# Patient Record
Sex: FEMALE | Race: WHITE | Employment: FULL TIME | URBAN - METROPOLITAN AREA
[De-identification: names, ages, dates, MRNs, and addresses within clinical notes are randomized per-mention and may not be internally consistent; named-entity substitution may affect disease eponyms.]

---

## 2019-02-15 ENCOUNTER — HOSPITAL ENCOUNTER (OUTPATIENT)
Age: 59
Discharge: HOME | End: 2019-02-15
Payer: COMMERCIAL

## 2019-02-18 ENCOUNTER — HOSPITAL ENCOUNTER (OUTPATIENT)
Dept: GENERAL RADIOLOGY | Age: 59
Discharge: HOME | End: 2019-02-18
Payer: COMMERCIAL

## 2019-02-18 ENCOUNTER — HOSPITAL ENCOUNTER (OUTPATIENT)
Age: 59
Discharge: HOME | End: 2019-02-18
Payer: COMMERCIAL

## 2019-02-18 DIAGNOSIS — M54.5 LOW BACK PAIN, UNSPECIFIED BACK PAIN LATERALITY, UNSPECIFIED CHRONICITY, WITH SCIATICA PRESENCE UNSPECIFIED: ICD-10-CM

## 2019-02-18 DIAGNOSIS — M54.2 CERVICALGIA: ICD-10-CM

## 2019-02-18 LAB
ALT SERPL-CCNC: 18 U/L (ref 10–40)
AST SERPL-CCNC: 15 IU/L (ref 15–37)
C-REACTIVE PROTEIN, HIGH SENSITIVITY: 11.6 MG/L
ERYTHROCYTE SEDIMENTATION RATE: 77 MM/HR (ref 0–30)
HBV SURFACE AB TITR SER: <3.5 {TITER}
HEPATITIS B SURFACE ANTIGEN: NON REACTIVE
HEPATITIS C ANTIBODY: NON REACTIVE
TOTAL CK: 122 IU/L (ref 26–140)
TSH HIGH SENSITIVITY: 9.01 UIU/ML (ref 0.27–4.2)

## 2019-02-18 PROCEDURE — 85652 RBC SED RATE AUTOMATED: CPT

## 2019-02-18 PROCEDURE — 86707 HEPATITIS BE ANTIBODY: CPT

## 2019-02-18 PROCEDURE — 82550 ASSAY OF CK (CPK): CPT

## 2019-02-18 PROCEDURE — 72050 X-RAY EXAM NECK SPINE 4/5VWS: CPT

## 2019-02-18 PROCEDURE — 84460 ALANINE AMINO (ALT) (SGPT): CPT

## 2019-02-18 PROCEDURE — 87340 HEPATITIS B SURFACE AG IA: CPT

## 2019-02-18 PROCEDURE — 71046 X-RAY EXAM CHEST 2 VIEWS: CPT

## 2019-02-18 PROCEDURE — 36415 COLL VENOUS BLD VENIPUNCTURE: CPT

## 2019-02-18 PROCEDURE — 86704 HEP B CORE ANTIBODY TOTAL: CPT

## 2019-02-18 PROCEDURE — 86038 ANTINUCLEAR ANTIBODIES: CPT

## 2019-02-18 PROCEDURE — 72100 X-RAY EXAM L-S SPINE 2/3 VWS: CPT

## 2019-02-18 PROCEDURE — 84443 ASSAY THYROID STIM HORMONE: CPT

## 2019-02-18 PROCEDURE — 84450 TRANSFERASE (AST) (SGOT): CPT

## 2019-02-18 PROCEDURE — 86803 HEPATITIS C AB TEST: CPT

## 2019-02-18 PROCEDURE — 86706 HEP B SURFACE ANTIBODY: CPT

## 2019-02-18 PROCEDURE — 86812 HLA TYPING A B OR C: CPT

## 2019-02-18 PROCEDURE — 87350 HEPATITIS BE AG IA: CPT

## 2019-02-18 PROCEDURE — 86140 C-REACTIVE PROTEIN: CPT

## 2019-02-21 LAB
ANTI-NUCLEAR ANTIBODY (ANA): NORMAL
HEPATITIS B CORE TOTAL ANTIBODY: NEGATIVE
HEPATITIS BE ANTIBODY: NEGATIVE
HEPATITIS BE ANTIGEN: NEGATIVE
HLA B27: NEGATIVE

## 2019-03-17 ENCOUNTER — HOSPITAL ENCOUNTER (INPATIENT)
Age: 59
LOS: 2 days | Discharge: HOME | DRG: 314 | End: 2019-03-19
Attending: EMERGENCY MEDICINE | Admitting: FAMILY MEDICINE
Payer: COMMERCIAL

## 2019-03-17 ENCOUNTER — APPOINTMENT (OUTPATIENT)
Dept: CT IMAGING | Age: 59
DRG: 314 | End: 2019-03-17
Payer: COMMERCIAL

## 2019-03-17 ENCOUNTER — APPOINTMENT (OUTPATIENT)
Dept: ULTRASOUND IMAGING | Age: 59
DRG: 314 | End: 2019-03-17
Payer: COMMERCIAL

## 2019-03-17 DIAGNOSIS — R10.12 LEFT UPPER QUADRANT PAIN: ICD-10-CM

## 2019-03-17 DIAGNOSIS — R55 SYNCOPE, UNSPECIFIED SYNCOPE TYPE: Primary | ICD-10-CM

## 2019-03-17 DIAGNOSIS — E03.9 HYPOTHYROIDISM, UNSPECIFIED TYPE: ICD-10-CM

## 2019-03-17 LAB
ALBUMIN SERPL-MCNC: 4.2 GM/DL (ref 3.4–5)
ALP BLD-CCNC: 117 IU/L (ref 40–129)
ALT SERPL-CCNC: 94 U/L (ref 10–40)
ANION GAP SERPL CALCULATED.3IONS-SCNC: 13 MMOL/L (ref 4–16)
AST SERPL-CCNC: 71 IU/L (ref 15–37)
BASOPHILS ABSOLUTE: 0 K/CU MM
BASOPHILS RELATIVE PERCENT: 0.5 % (ref 0–1)
BILIRUB SERPL-MCNC: 1 MG/DL (ref 0–1)
BUN BLDV-MCNC: 22 MG/DL (ref 6–23)
CALCIUM SERPL-MCNC: 9 MG/DL (ref 8.3–10.6)
CHLORIDE BLD-SCNC: 102 MMOL/L (ref 99–110)
CO2: 23 MMOL/L (ref 21–32)
CREAT SERPL-MCNC: 1 MG/DL (ref 0.6–1.1)
DIFFERENTIAL TYPE: ABNORMAL
EKG ATRIAL RATE: 72 BPM
EKG DIAGNOSIS: NORMAL
EKG P AXIS: 54 DEGREES
EKG P-R INTERVAL: 142 MS
EKG Q-T INTERVAL: 396 MS
EKG QRS DURATION: 82 MS
EKG QTC CALCULATION (BAZETT): 433 MS
EKG R AXIS: 41 DEGREES
EKG T AXIS: 48 DEGREES
EKG VENTRICULAR RATE: 72 BPM
EOSINOPHILS ABSOLUTE: 0.1 K/CU MM
EOSINOPHILS RELATIVE PERCENT: 1.2 % (ref 0–3)
GFR AFRICAN AMERICAN: >60 ML/MIN/1.73M2
GFR NON-AFRICAN AMERICAN: 57 ML/MIN/1.73M2
GLUCOSE BLD-MCNC: 106 MG/DL (ref 70–99)
HCT VFR BLD CALC: 39.5 % (ref 37–47)
HEMOGLOBIN: 12.8 GM/DL (ref 12.5–16)
IMMATURE NEUTROPHIL %: 2.1 % (ref 0–0.43)
LYMPHOCYTES ABSOLUTE: 1.2 K/CU MM
LYMPHOCYTES RELATIVE PERCENT: 21.2 % (ref 24–44)
MCH RBC QN AUTO: 31.2 PG (ref 27–31)
MCHC RBC AUTO-ENTMCNC: 32.4 % (ref 32–36)
MCV RBC AUTO: 96.3 FL (ref 78–100)
MONOCYTES ABSOLUTE: 0.6 K/CU MM
MONOCYTES RELATIVE PERCENT: 9.7 % (ref 0–4)
NUCLEATED RBC %: 0 %
PDW BLD-RTO: 12.9 % (ref 11.7–14.9)
PLATELET # BLD: 249 K/CU MM (ref 140–440)
PMV BLD AUTO: 9.2 FL (ref 7.5–11.1)
POTASSIUM SERPL-SCNC: 4 MMOL/L (ref 3.5–5.1)
RBC # BLD: 4.1 M/CU MM (ref 4.2–5.4)
SEGMENTED NEUTROPHILS ABSOLUTE COUNT: 3.8 K/CU MM
SEGMENTED NEUTROPHILS RELATIVE PERCENT: 65.3 % (ref 36–66)
SODIUM BLD-SCNC: 138 MMOL/L (ref 135–145)
TOTAL IMMATURE NEUTOROPHIL: 0.12 K/CU MM
TOTAL NUCLEATED RBC: 0 K/CU MM
TOTAL PROTEIN: 7.4 GM/DL (ref 6.4–8.2)
TROPONIN T: <0.01 NG/ML
TSH HIGH SENSITIVITY: 13.84 UIU/ML (ref 0.27–4.2)
WBC # BLD: 5.8 K/CU MM (ref 4–10.5)

## 2019-03-17 PROCEDURE — 6370000000 HC RX 637 (ALT 250 FOR IP): Performed by: NURSE PRACTITIONER

## 2019-03-17 PROCEDURE — 93010 ELECTROCARDIOGRAM REPORT: CPT | Performed by: INTERNAL MEDICINE

## 2019-03-17 PROCEDURE — 2580000003 HC RX 258: Performed by: EMERGENCY MEDICINE

## 2019-03-17 PROCEDURE — 85025 COMPLETE CBC W/AUTO DIFF WBC: CPT

## 2019-03-17 PROCEDURE — 96375 TX/PRO/DX INJ NEW DRUG ADDON: CPT

## 2019-03-17 PROCEDURE — 96374 THER/PROPH/DIAG INJ IV PUSH: CPT

## 2019-03-17 PROCEDURE — 96376 TX/PRO/DX INJ SAME DRUG ADON: CPT

## 2019-03-17 PROCEDURE — 6360000004 HC RX CONTRAST MEDICATION: Performed by: EMERGENCY MEDICINE

## 2019-03-17 PROCEDURE — 93880 EXTRACRANIAL BILAT STUDY: CPT

## 2019-03-17 PROCEDURE — 1200000000 HC SEMI PRIVATE

## 2019-03-17 PROCEDURE — 96372 THER/PROPH/DIAG INJ SC/IM: CPT

## 2019-03-17 PROCEDURE — 2500000003 HC RX 250 WO HCPCS: Performed by: NURSE PRACTITIONER

## 2019-03-17 PROCEDURE — 6360000002 HC RX W HCPCS: Performed by: NURSE PRACTITIONER

## 2019-03-17 PROCEDURE — 93005 ELECTROCARDIOGRAM TRACING: CPT | Performed by: EMERGENCY MEDICINE

## 2019-03-17 PROCEDURE — 84443 ASSAY THYROID STIM HORMONE: CPT

## 2019-03-17 PROCEDURE — 80053 COMPREHEN METABOLIC PANEL: CPT

## 2019-03-17 PROCEDURE — 74177 CT ABD & PELVIS W/CONTRAST: CPT

## 2019-03-17 PROCEDURE — 99285 EMERGENCY DEPT VISIT HI MDM: CPT

## 2019-03-17 PROCEDURE — 84484 ASSAY OF TROPONIN QUANT: CPT

## 2019-03-17 PROCEDURE — 6360000002 HC RX W HCPCS: Performed by: EMERGENCY MEDICINE

## 2019-03-17 PROCEDURE — 36415 COLL VENOUS BLD VENIPUNCTURE: CPT

## 2019-03-17 PROCEDURE — 71275 CT ANGIOGRAPHY CHEST: CPT

## 2019-03-17 PROCEDURE — G0378 HOSPITAL OBSERVATION PER HR: HCPCS

## 2019-03-17 PROCEDURE — 2580000003 HC RX 258: Performed by: NURSE PRACTITIONER

## 2019-03-17 RX ORDER — SODIUM CHLORIDE 0.9 % (FLUSH) 0.9 %
10 SYRINGE (ML) INJECTION PRN
Status: DISCONTINUED | OUTPATIENT
Start: 2019-03-17 | End: 2019-03-19 | Stop reason: HOSPADM

## 2019-03-17 RX ORDER — SODIUM CHLORIDE 0.9 % (FLUSH) 0.9 %
10 SYRINGE (ML) INJECTION EVERY 12 HOURS SCHEDULED
Status: DISCONTINUED | OUTPATIENT
Start: 2019-03-17 | End: 2019-03-19 | Stop reason: HOSPADM

## 2019-03-17 RX ORDER — 0.9 % SODIUM CHLORIDE 0.9 %
1000 INTRAVENOUS SOLUTION INTRAVENOUS ONCE
Status: COMPLETED | OUTPATIENT
Start: 2019-03-17 | End: 2019-03-17

## 2019-03-17 RX ORDER — HYDROMORPHONE HYDROCHLORIDE 2 MG/1
1 TABLET ORAL EVERY 4 HOURS PRN
Status: DISCONTINUED | OUTPATIENT
Start: 2019-03-17 | End: 2019-03-18

## 2019-03-17 RX ORDER — FENTANYL CITRATE 50 UG/ML
25 INJECTION, SOLUTION INTRAMUSCULAR; INTRAVENOUS ONCE
Status: COMPLETED | OUTPATIENT
Start: 2019-03-17 | End: 2019-03-17

## 2019-03-17 RX ORDER — IBUPROFEN 600 MG/1
600 TABLET ORAL
Status: DISCONTINUED | OUTPATIENT
Start: 2019-03-17 | End: 2019-03-19 | Stop reason: HOSPADM

## 2019-03-17 RX ORDER — ACETAMINOPHEN 325 MG/1
650 TABLET ORAL EVERY 4 HOURS PRN
Status: DISCONTINUED | OUTPATIENT
Start: 2019-03-17 | End: 2019-03-19 | Stop reason: HOSPADM

## 2019-03-17 RX ORDER — LEVOTHYROXINE SODIUM 0.1 MG/1
100 TABLET ORAL DAILY
Status: DISCONTINUED | OUTPATIENT
Start: 2019-03-17 | End: 2019-03-18

## 2019-03-17 RX ORDER — 0.9 % SODIUM CHLORIDE 0.9 %
10 VIAL (ML) INJECTION
Status: COMPLETED | OUTPATIENT
Start: 2019-03-17 | End: 2019-03-17

## 2019-03-17 RX ORDER — SODIUM CHLORIDE 9 MG/ML
INJECTION, SOLUTION INTRAVENOUS CONTINUOUS
Status: DISPENSED | OUTPATIENT
Start: 2019-03-17 | End: 2019-03-18

## 2019-03-17 RX ORDER — VALSARTAN 320 MG/1
320 TABLET ORAL DAILY
Status: ON HOLD | COMMUNITY
End: 2019-03-19 | Stop reason: HOSPADM

## 2019-03-17 RX ORDER — ONDANSETRON 2 MG/ML
4 INJECTION INTRAMUSCULAR; INTRAVENOUS ONCE
Status: COMPLETED | OUTPATIENT
Start: 2019-03-17 | End: 2019-03-17

## 2019-03-17 RX ORDER — PREDNISONE 10 MG/1
10 TABLET ORAL DAILY
Status: DISCONTINUED | OUTPATIENT
Start: 2019-03-17 | End: 2019-03-19 | Stop reason: HOSPADM

## 2019-03-17 RX ORDER — GABAPENTIN 300 MG/1
300 CAPSULE ORAL 2 TIMES DAILY
COMMUNITY
End: 2019-05-08

## 2019-03-17 RX ORDER — VALSARTAN 160 MG/1
320 TABLET ORAL DAILY
Status: DISCONTINUED | OUTPATIENT
Start: 2019-03-17 | End: 2019-03-19

## 2019-03-17 RX ORDER — ETODOLAC 400 MG/1
500 TABLET, FILM COATED ORAL 2 TIMES DAILY
Status: DISCONTINUED | OUTPATIENT
Start: 2019-03-17 | End: 2019-03-17 | Stop reason: CLARIF

## 2019-03-17 RX ORDER — GABAPENTIN 300 MG/1
300 CAPSULE ORAL 2 TIMES DAILY
Status: DISCONTINUED | OUTPATIENT
Start: 2019-03-17 | End: 2019-03-19 | Stop reason: HOSPADM

## 2019-03-17 RX ORDER — ONDANSETRON 2 MG/ML
4 INJECTION INTRAMUSCULAR; INTRAVENOUS EVERY 6 HOURS PRN
Status: DISCONTINUED | OUTPATIENT
Start: 2019-03-17 | End: 2019-03-19 | Stop reason: HOSPADM

## 2019-03-17 RX ADMIN — HYDROMORPHONE HYDROCHLORIDE 1 MG: 2 TABLET ORAL at 22:24

## 2019-03-17 RX ADMIN — LEVOTHYROXINE SODIUM 100 MCG: 100 TABLET ORAL at 17:47

## 2019-03-17 RX ADMIN — FAMOTIDINE 20 MG: 10 INJECTION, SOLUTION INTRAVENOUS at 20:46

## 2019-03-17 RX ADMIN — IOPAMIDOL 75 ML: 755 INJECTION, SOLUTION INTRAVENOUS at 13:12

## 2019-03-17 RX ADMIN — SODIUM CHLORIDE 1000 ML: 9 INJECTION, SOLUTION INTRAVENOUS at 12:56

## 2019-03-17 RX ADMIN — SODIUM CHLORIDE 10 ML: 9 INJECTION, SOLUTION INTRAMUSCULAR; INTRAVENOUS; SUBCUTANEOUS at 13:13

## 2019-03-17 RX ADMIN — GABAPENTIN 300 MG: 300 CAPSULE ORAL at 20:46

## 2019-03-17 RX ADMIN — SODIUM CHLORIDE: 9 INJECTION, SOLUTION INTRAVENOUS at 20:46

## 2019-03-17 RX ADMIN — FENTANYL CITRATE 25 MCG: 50 INJECTION INTRAMUSCULAR; INTRAVENOUS at 14:47

## 2019-03-17 RX ADMIN — ENOXAPARIN SODIUM 40 MG: 40 INJECTION SUBCUTANEOUS at 17:50

## 2019-03-17 RX ADMIN — VALSARTAN 320 MG: 160 TABLET, FILM COATED ORAL at 17:47

## 2019-03-17 RX ADMIN — FENTANYL CITRATE 25 MCG: 50 INJECTION INTRAMUSCULAR; INTRAVENOUS at 12:53

## 2019-03-17 RX ADMIN — PREDNISONE 10 MG: 10 TABLET ORAL at 17:47

## 2019-03-17 RX ADMIN — ONDANSETRON 4 MG: 2 INJECTION INTRAMUSCULAR; INTRAVENOUS at 12:56

## 2019-03-17 RX ADMIN — ONDANSETRON 4 MG: 2 INJECTION INTRAMUSCULAR; INTRAVENOUS at 20:46

## 2019-03-17 ASSESSMENT — PAIN SCALES - GENERAL
PAINLEVEL_OUTOF10: 7
PAINLEVEL_OUTOF10: 6
PAINLEVEL_OUTOF10: 7
PAINLEVEL_OUTOF10: 5
PAINLEVEL_OUTOF10: 7
PAINLEVEL_OUTOF10: 6
PAINLEVEL_OUTOF10: 7

## 2019-03-17 ASSESSMENT — PAIN DESCRIPTION - ORIENTATION: ORIENTATION: LEFT;UPPER

## 2019-03-17 ASSESSMENT — PAIN DESCRIPTION - LOCATION: LOCATION: ABDOMEN

## 2019-03-17 NOTE — ED NOTES
Report called to [facility] RN Patient ready for transport. Collette Chan [facility]  03/17/19 [id number]

## 2019-03-17 NOTE — ED PROVIDER NOTES
eMERGENCY dEPARTMENT eNCOUnter      CHIEF COMPLAINT:   Syncope  Abdominal pain    HPI: Suzanne Herman is a 66 y.o. female who presents to the ED for evaluation after having a syncopal episode. The patient states that she passed out twice today at home. She was in the shower initially and felt lightheaded and passed out. She denies being injured with this episode. She then passed out again and so family called EMS. She states she passed out 2 other times this week. She denies any injury with these episodes. She denies any preceding chest pain or palpitations. She also complains of left upper quadrant abdominal pain for the past week. She states that it is achy and sharp. It is constant. There are no exacerbating or alleviating factors. She denies any associated nausea, vomiting or diarrhea. She rates the pain as 8 out of 10. She denies fevers, chills, chest pain, shortness of breath, flank pain, dysuria, hematuria or any other complaints. REVIEW OF SYSTEMS:   Constitutional:  Denies fever or chills  Eyes:  Denies change in visual acuity  HENT:  Denies nasal congestion or sore throat  Respiratory:  Denies cough or shortness of breath  Cardiovascular:  Denies chest pain or edema  GI:  + abdominal pain, denies nausea, vomiting, bloody stools or diarrhea  :  Denies dysuria  Musculoskeletal:  Denies back pain or joint pain  Integument:  Denies rash  Neurologic:  Admits to having a syncopal episode, Denies headache, focal weakness or sensory changes  \"Remaining review of systems reviewed and negative. I have reviewed the nursing triage documentation and agree unless otherwise noted below. \"      PAST MEDICAL HISTORY:   Past Medical History:   Diagnosis Date    Anxiety     \"work related\"    Arthritis     Chest pain     Fatigue     Fatigue     \"increased fatigue, random fever, headaches- doing lumbar puncture to check this\"(scheduled 9/11/2013)    Generalized anxiety disorder     GERD (gastroesophageal reflux disease)     Occasionally    H/O cardiovascular stress test 1/13/2014    cardiolite-no ischemia, EF 70%,normal    H/O CT scan of chest 12/23/13    Right upper lobe pulmonary nodule continues to shrink in size. Mediastinal and hilar adenopathy  are present but also significant improved.  Histoplasmosis     History of cardiac monitoring 1/13/14    Event - NSR    History of echocardiogram 1/13/2014    EF 55% mild TR    History of histoplasmosis     History of hysterectomy     History of kidney stones     \"8 yrs ago- passed them on my own\"    History of lymph node biopsy     Hypertension     Hypothyroidism     Kidney stones     Migraines     \"have had a constant migraine for the past 6 weeks, gets worse at times but never goes away\"    Mild tricuspid regurgitation 1/13/2014    Peptic ulcer     Peptic ulcer     As a teenager.  Shortness of breath     SOB (shortness of breath)     Syncope and collapse     Thyroid disease        CURRENT MEDICATIONS:   Home medications reviewed. SURGICAL HISTORY:   Past Surgical History:   Procedure Laterality Date    APPENDECTOMY      APPENDECTOMY  1979(pc)    CHOLECYSTECTOMY      CHOLECYSTECTOMY  2008(pc)    Lap Choley    COLONOSCOPY  2008    DILATION AND CURETTAGE OF UTERUS  2008    ENDOSCOPY, COLON, DIAGNOSTIC  2008    HYSTERECTOMY  2008    per old chart pt had exp.  laparotomy with lysis of adhesions and JACK/BSO done 2008(pc)    KNEE SURGERY Left 11/2012    \"ACL surgery with a scope\"    MEDIASTINOSCOPY  11/05/2013    OTHER SURGICAL HISTORY  11 05 2013    medianstinoscopy    JACK AND BSO         FAMILY HISTORY:   Family History   Problem Relation Age of Onset    Other Mother         lung neoplasm, malignant    Cancer Mother         lung ca    Heart Disease Mother     Other Father         lymphoma    Cancer Father         lymphoma    High Blood Pressure Sister     Heart Disease Sister     Stroke Sister         Andrea Hedger Diabetes Sister     Heart Disease Maternal Grandfather        SOCIAL HISTORY:   Social History     Socioeconomic History    Marital status:      Spouse name: Not on file    Number of children: Not on file    Years of education: Not on file    Highest education level: Not on file   Occupational History    Not on file   Social Needs    Financial resource strain: Not on file    Food insecurity:     Worry: Not on file     Inability: Not on file    Transportation needs:     Medical: Not on file     Non-medical: Not on file   Tobacco Use    Smoking status: Never Smoker    Smokeless tobacco: Never Used    Tobacco comment: exposed to heavy 2nd hand smoke x 18 yrs per parents   Substance and Sexual Activity    Alcohol use: Yes     Comment: glass of wine one time per month at the most     CAFFEINE: 1 cup coffee or 1 soda daily.  Drug use: No    Sexual activity: Not on file     Comment:    Lifestyle    Physical activity:     Days per week: Not on file     Minutes per session: Not on file    Stress: Not on file   Relationships    Social connections:     Talks on phone: Not on file     Gets together: Not on file     Attends Judaism service: Not on file     Active member of club or organization: Not on file     Attends meetings of clubs or organizations: Not on file     Relationship status: Not on file    Intimate partner violence:     Fear of current or ex partner: Not on file     Emotionally abused: Not on file     Physically abused: Not on file     Forced sexual activity: Not on file   Other Topics Concern    Not on file   Social History Narrative    ** Merged History Encounter **            ALLERGIES: Other; Pcn [penicillins]; and Pcn [penicillins]    PHYSICAL EXAM:  VITAL SIGNS:   ED Triage Vitals   Enc Vitals Group      BP       Pulse       Resp       Temp       Temp src       SpO2       Weight       Height       Head Circumference       Peak Flow       Pain Score       Pain Loc       Pain Edu? Excl. pm    TECHNIQUE:  CTA of the chest, abdomen and pelvis was performed after the administration  of intravenous contrast.  Multiplanar reformatted images are provided for  review.  Dose modulation, iterative reconstruction, and/or weight based  adjustment of the mA/kV was utilized to reduce the radiation dose to as low  as reasonably achievable. COMPARISON:  CT chest 02/20/2014    HISTORY:  ORDERING SYSTEM PROVIDED HISTORY: Syncope, Evaluate for PE, left rib pain  after fall, evaluate for fracture  TECHNOLOGIST PROVIDED HISTORY:  Ordering Physician Provided Reason for Exam: Syncope, Evaluate for PE, left  rib pain after fall, evalaute for fracture  Acuity: Acute  Type of Exam: Initial  Additional signs and symptoms: none  Relevant Medical/Surgical History: 100ml isovue 370/ gfr>60; ORDERING SYSTEM  PROVIDED HISTORY: LUQ abdominal pain  TECHNOLOGIST PROVIDED HISTORY:  Additional Contrast?->None  Ordering Physician Provided Reason for Exam: LUQ abdominal pain  Acuity: Acute  Type of Exam: Initial  Additional signs and symptoms: none  Relevant Medical/Surgical History: contrast used from cta chest    FINDINGS:    Chest:    Pulmonary Arteries: Pulmonary arteries are adequately opacified for  evaluation. No evidence of intraluminal filling defect to suggest pulmonary  embolism.  Main pulmonary artery is normal in caliber. Mediastinum: No evidence of mediastinal lymphadenopathy.  The heart and  pericardium demonstrate no acute abnormality.  There is no acute abnormality  of the thoracic aorta. Lungs/pleura: Central airways are clear.  There are multiple, ground-glass  nodular opacities throughout the lungs.  No evidence of pulmonary edema.  No  pleural effusion or pneumothorax. Soft Tissues/Bones: No acute bone or soft tissue abnormality. Abdomen/Pelvis:    Organs:   Liver is normal in morphology.  No biliary duct dilatation.  Post  cholecystectomy.  Spleen, adrenal glands, and pancreas are normal. HISTORY:  Ordering Physician Provided Reason for Exam: Syncope, Evaluate for PE, left  rib pain after fall, evalaute for fracture  Acuity: Acute  Type of Exam: Initial  Additional signs and symptoms: none  Relevant Medical/Surgical History: 100ml isovue 370/ gfr>60; ORDERING SYSTEM  PROVIDED HISTORY: LUQ abdominal pain  TECHNOLOGIST PROVIDED HISTORY:  Additional Contrast?->None  Ordering Physician Provided Reason for Exam: LUQ abdominal pain  Acuity: Acute  Type of Exam: Initial  Additional signs and symptoms: none  Relevant Medical/Surgical History: contrast used from cta chest    FINDINGS:    Chest:    Pulmonary Arteries: Pulmonary arteries are adequately opacified for  evaluation. No evidence of intraluminal filling defect to suggest pulmonary  embolism.  Main pulmonary artery is normal in caliber. Mediastinum: No evidence of mediastinal lymphadenopathy.  The heart and  pericardium demonstrate no acute abnormality.  There is no acute abnormality  of the thoracic aorta. Lungs/pleura: Central airways are clear.  There are multiple, ground-glass  nodular opacities throughout the lungs.  No evidence of pulmonary edema.  No  pleural effusion or pneumothorax. Soft Tissues/Bones: No acute bone or soft tissue abnormality. Abdomen/Pelvis:    Organs:   Liver is normal in morphology. No biliary duct dilatation.  Post  cholecystectomy.  Spleen, adrenal glands, and pancreas are normal.  Kidneys  are symmetric in size and enhancement.   No hydronephrosis. GI/Bowel: Stomach, small bowel, and colon are nondilated. Post  appendectomy. Submucosal fat deposition involving the ascending and proximal  transverse colon is consistent with prior inflammatory process. Pelvis: Urinary bladder is unremarkable.  Uterus is surgically absent.  No  free fluid in the pelvis. Peritoneum/Retroperitoneum:   Abdominal aorta is normal in caliber.  No  abdominal or retroperitoneal adenopathy.  No free fluid in the tonic-clonic movements and no loss of bowel or bladder function, which makes seizure seem unlikely. I have a low suspicion for seizure, concussion, arrhythmia, psychosis, overdose, cardiac or respiratory arrest, status epilepticus, meningitis, or sepsis. The etiology of the patient's abdominal pain is unclear at this time. I have a low suspicion for acute appendicitis, intra-abdominal abscess, bowel obstruction, perforation or acute surgical abdomen. I recommended admission to the hospital and the patient was agreeable. I discussed the case with the hospitalist who will admit the patient for further treatment and care. The patient is currently in stable condition awaiting admission. Clinical Impression:  1. Syncope, unspecified syncope type    2. Left upper quadrant pain    3. Hypothyroidism, unspecified type        Comment: Please note this report has been produced using speech recognition software and may contain errors related to that system including errors in grammar, punctuation, and spelling, as well as words and phrases that may be inappropriate. If there are any questions or concerns please feel free to contact the dictating provider for clarification.         Frankie Mancuso MD  03/17/19 [id number]

## 2019-03-17 NOTE — ED NOTES
Bed: 03TR-03  Expected date:   Expected time:   Means of arrival:   Comments:  Za Lomas., RN  03/17/19 0729

## 2019-03-17 NOTE — ED NOTES
Transport at bedside. Patient taken to the floor. Collette Rivera [facility], [facility]  03/17/19 [id number]

## 2019-03-17 NOTE — ED NOTES
Med rec completed with patient and family member over the phone. Collette Enrique, [facility]  03/17/19 [id number]

## 2019-03-17 NOTE — ED TRIAGE NOTES
Patient brought to the ED via EMS from home after \"passing out\" in the shower this morning. No reported injury. LUQ worsening over 1 week. Productive cough for over a week. started on Methotrexate 3 weeks ago for RA.

## 2019-03-17 NOTE — H&P
History and Physical      Name:  Segundo Prescott /Age/Sex: 1960  (66 y.o. female)   MRN & CSN:  [id number] & [id number] Admission Date/Time: 3/17/2019 11:10 AM   Location:  [id number]03 PCP: Janneth Darby MD       Hospital Day: 1    Discussed patient and POC with Dr. Ian Bess and Plan:     Segundo Prescott is a 66 y.o.  female  who presents with multiple syncopal episodes and left-sided abdominal pain. - Syncope with collapse  Denies injury with episodes over the last 4 weeks  Orthostatics  Shalom sob, palpitations  Troponin neg  Echo  Carotid duplex  Trend trop    - Left-sided abdominal pain  Radiation to back, worse with PO intake but not improved by not eating  CT abdomen/pelvis without acute findings  Denies melanotic stool, hematochezia  Clear liquid diet  IVF due to decreased PO intake last 4 days  Pain control  C/s to GI  Patient is on steroids and NSaids for rheumatoid arthritis    - Hypothyroidism  TSH 13.8  Patient reports she is currently working with PCP on adjusting Synthroid   Continue home synthroid 100 mcg  Obtain T 4    -- Chronic: treatment continued per home medications unless  contraindicated by above plan and assessment. Rheumatoid arthritis   HTN home meds      Discussed patient with ED physician    Diet Clear liquid   DVT Prophylaxis [x] Lovenox, []  Heparin, [] SCDs, [] Ambulation   GI Prophylaxis [] PPI,  [x] H2 Blocker,  [] Carafate,  [] Diet/Tube Feeds   Code Status Full   Disposition Patient requires continued admission due to    MDM [] Low, [x] Moderate,[]  High  Patient's risk as above due to      [] One or more chronic illnesses with mild exacerbation progression      [x] Two or more stable chronic illnesses      [x] Undiagnosed new problem with uncertain prognosis      [] Elective major surgery      []Prescription drug management     History of Present Illness:     Chief Complaint: Syncopal episodes with diffuse abdominal pain    Segundo Prescott is a 66 y.o. female  who presents from home with 4 syncopal episodes over the last week, 2 today. She denies injury associated with these. She is not clear if she is experiencing light-headedness and other pre-syncopal symptoms with these events. She denies chest pain and palpitations. She also reports new-onset left-sided abdominal pain over the same period. Denies melanotic stool, hematochezia, emesis. States she is nauseas. States eating food makes pain worse; not eating does not help pain. She reports upper and lower scopes about 10 years ago. States abdominal pain is left side from under rib cage to lower abdominal with radiation to her back and sometimes across the lower abdomen to the right side. Denies abnormal vaginal discharge. Has had hysterectomy and cholecystectomy remotely. She denies fever/chills, cough, chest pressure/pain. At time of exam rates pain 8/10 for left-sided abdomen. Principal Problem:   Syncope with collapse, abdominal pain    Present on admission:  History of histoplasmosis 2014      Hypothyroidism      Rheumatoid arthritis        Ten point ROS reviewed negative, unless as noted above    Objective:   No intake or output data in the 24 hours ending 03/17/19 1548   Vitals:   Vitals:    03/17/19 1419   BP: 110/61   Pulse: 66   Resp: 14   Temp: 98.3   SpO2: 96%     Physical Exam:   GEN Awake female, sitting upright in bed in no apparent distress. Appears given age. EYES Pupils are 3mm and PERRLA bilat. EOMs intact. No scleral erythema, discharge, or conjunctivitis. HENT Mucous membranes are moist. Oral pharynx without exudates, no evidence of thrush. NECK Supple, no apparent thyromegaly or masses. RESP Clear to auscultation, no wheezes, rales or rhonchi. Symmetric chest movement while on room air. CARDIO/VASC S1/S2 auscultated. Regular rate without appreciable murmurs, rubs, or gallops. No JVD or carotid bruits. Peripheral pulses equal bilaterally and palpable.  No peripheral edema.  GI Abdomen is diffusely tender across RLQ and LLQ and LUQ. Abdomen is soft without significant guarding. No mass noted. Bowel sounds are normoactive. Rectal exam deferred.  No costovertebral angle tenderness. Valadez catheter is not present. HEME/LYMPH No palpable cervical lymphadenopathy and no hepatosplenomegaly. No petechiae or ecchymoses. MSK No gross joint deformities. SKIN Normal coloration, warm, dry. NEURO Cranial nerves appear grossly intact, normal speech, no lateralizing weakness. PSYCH Awake, alert, oriented x 4. Affect appropriate. Past Medical History:      Past Medical History:   Diagnosis Date    Anxiety     \"work related\"    Arthritis     Chest pain     Fatigue     Fatigue     \"increased fatigue, random fever, headaches- doing lumbar puncture to check this\"(scheduled 9/11/2013)    Generalized anxiety disorder     GERD (gastroesophageal reflux disease)     Occasionally    H/O cardiovascular stress test 1/13/2014    cardiolite-no ischemia, EF 70%,normal    H/O CT scan of chest 12/23/13    Right upper lobe pulmonary nodule continues to shrink in size. Mediastinal and hilar adenopathy  are present but also significant improved.  Histoplasmosis     History of cardiac monitoring 1/13/14    Event - NSR    History of echocardiogram 1/13/2014    EF 55% mild TR    History of histoplasmosis     History of hysterectomy     History of kidney stones     \"8 yrs ago- passed them on my own\"    History of lymph node biopsy     Hypertension     Hypothyroidism     Kidney stones     Migraines     \"have had a constant migraine for the past 6 weeks, gets worse at times but never goes away\"    Mild tricuspid regurgitation 1/13/2014    Peptic ulcer     Peptic ulcer     As a teenager.  Shortness of breath     SOB (shortness of breath)     Syncope and collapse     Thyroid disease      PSHX:  has a past surgical history that includes Appendectomy;  Cholecystectomy; russell and bso (cervix removed); Appendectomy (1979(pc)); Cholecystectomy (2008(pc)); Hysterectomy (2008); Endoscopy, colon, diagnostic (2008); Colonoscopy (2008); Dilation and curettage of uterus (2008); knee surgery (Left, 11/2012); other surgical history (11 05 2013); and Mediastinoscopy (11/05/2013). Allergies: Allergies   Allergen Reactions    Other Anaphylaxis     allergic to cilantro \"trouble breathing\"    Pcn [Penicillins] Nausea Only    Pcn [Penicillins] Nausea And Vomiting       FAM HX: family history includes Cancer in her father and mother; Diabetes in her sister; Heart Disease in her maternal grandfather, mother, and sister; High Blood Pressure in her sister; Other in her father and mother; Stroke in her sister. Soc HX:   Social History     Socioeconomic History    Marital status:      Spouse name: None    Number of children: None    Years of education: None    Highest education level: None   Occupational History    None   Social Needs    Financial resource strain: None    Food insecurity:     Worry: None     Inability: None    Transportation needs:     Medical: None     Non-medical: None   Tobacco Use    Smoking status: Never Smoker    Smokeless tobacco: Never Used    Tobacco comment: exposed to heavy 2nd hand smoke x 18 yrs per parents   Substance and Sexual Activity    Alcohol use: Yes     Comment: glass of wine one time per month at the most     CAFFEINE: 1 cup coffee or 1 soda daily.     Drug use: No    Sexual activity: None     Comment:    Lifestyle    Physical activity:     Days per week: None     Minutes per session: None    Stress: None   Relationships    Social connections:     Talks on phone: None     Gets together: None     Attends Islam service: None     Active member of club or organization: None     Attends meetings of clubs or organizations: None     Relationship status: None    Intimate partner violence:     Fear of current or ex partner: None Emotionally abused: None     Physically abused: None     Forced sexual activity: None   Other Topics Concern    None   Social History Narrative    ** Merged History Encounter **            Medications:   Medications:    methotrexate (RHEUMATREX) 2.5 MG chemo tablet Take 12.5 mg by mouth once a week Pawan Burch MD Needs Review   gabapentin (NEURONTIN) 300 MG capsule Take 300 mg by mouth 2 times daily. Pawan Burch MD Needs Review   Etodolac (LODINE PO) Take 500 mg by mouth 2 times daily Pawan Burch MD Needs Review   valsartan (DIOVAN) 320 MG tablet Take 320 mg by mouth daily Pawan Burch MD Needs Review   losartan (COZAAR) 50 MG tablet Take 50 mg by mouth daily. Pawan Burch MD Needs Review   predniSONE (DELTASONE) 10 MG tablet Take 10 mg by mouth daily Indications: unsure of dose  Pawan Burch MD Needs Review   levothyroxine (SYNTHROID) 88 MCG tablet Take 100 mcg by mouth Daily  Pawan Burch MD Needs Review   sertraline (ZOLOFT) 100 MG tablet  Pawan Burch MD Needs Review   itraconazole (SPORANOX) 100 MG capsule Take 100 mg by mouth daily. Take two capsules by mouth three times a day. Starting on 11/23, take 2 capsules daily for 12 weeks. Pawan Burch MD Needs Review   sertraline (ZOLOFT) 100 MG tablet Take 100 mg by mouth daily. Pawan Burch MD Needs Review   levothyroxine (SYNTHROID) 88 MCG tablet Take 88 mcg by mouth daily. Pawan Burch MD Needs Review   itraconazole (SPORANOX) 100 MG capsule Take 200 mg by mouth 2 times daily.  Pawan Burch MD Needs Review     Data:   CTA PULMONARY W CONTRAST [[id number]] Collected: 03/17/19 1350      Order Status: Completed Updated: 03/17/19 1405     Narrative:       EXAMINATION:  CTA OF THE CHEST; CT OF THE ABDOMEN AND PELVIS WITH CONTRAST 3/17/2019 12:29  pm; 3/17/2019 1:37 pm    TECHNIQUE:  CTA of the chest, abdomen and pelvis was performed after the administration  of intravenous contrast.  Multiplanar reformatted images are provided for  review.  Dose modulation, iterative reconstruction, and/or weight based  adjustment of the mA/kV was utilized to reduce the radiation dose to as low  as reasonably achievable. COMPARISON:  CT chest 02/20/2014    HISTORY:  ORDERING SYSTEM PROVIDED HISTORY: Syncope, Evaluate for PE, left rib pain  after fall, evaluate for fracture  TECHNOLOGIST PROVIDED HISTORY:  Ordering Physician Provided Reason for Exam: Syncope, Evaluate for PE, left  rib pain after fall, evalaute for fracture  Acuity: Acute  Type of Exam: Initial  Additional signs and symptoms: none  Relevant Medical/Surgical History: 100ml isovue 370/ gfr>60; ORDERING SYSTEM  PROVIDED HISTORY: LUQ abdominal pain  TECHNOLOGIST PROVIDED HISTORY:  Additional Contrast?->None  Ordering Physician Provided Reason for Exam: LUQ abdominal pain  Acuity: Acute  Type of Exam: Initial  Additional signs and symptoms: none  Relevant Medical/Surgical History: contrast used from cta chest    FINDINGS:    Chest:    Pulmonary Arteries: Pulmonary arteries are adequately opacified for  evaluation. No evidence of intraluminal filling defect to suggest pulmonary  embolism.  Main pulmonary artery is normal in caliber. Mediastinum: No evidence of mediastinal lymphadenopathy.  The heart and  pericardium demonstrate no acute abnormality.  There is no acute abnormality  of the thoracic aorta. Lungs/pleura: Central airways are clear.  There are multiple, ground-glass  nodular opacities throughout the lungs.  No evidence of pulmonary edema.  No  pleural effusion or pneumothorax. Soft Tissues/Bones: No acute bone or soft tissue abnormality. Abdomen/Pelvis:    Organs:   Liver is normal in morphology. No biliary duct dilatation.  Post  cholecystectomy.  Spleen, adrenal glands, and pancreas are normal.  Kidneys  are symmetric in size and enhancement.   No hydronephrosis.     GI/Bowel: Stomach, small bowel, and colon are nondilated. Post  appendectomy. Submucosal fat deposition involving the ascending and proximal  transverse colon is consistent with prior inflammatory process. Pelvis: Urinary bladder is unremarkable.  Uterus is surgically absent.  No  free fluid in the pelvis. Peritoneum/Retroperitoneum:   Abdominal aorta is normal in caliber.  No  abdominal or retroperitoneal adenopathy. No free fluid in the abdomen. Bones/Soft Tissues: No acute osseous abnormality.     Impression:       No evidence of pulmonary embolism. Multiple, bilateral ground-glass nodular opacities may reflect acute  infectious process or atypical pneumonia. No acute traumatic abnormality of the abdomen.     CT ABDOMEN PELVIS W IV CONTRAST Additional Contrast? None [[id number]] Collected: 03/17/19 1350     Order Status: Completed Updated: 03/17/19 140     Narrative:       EXAMINATION:  CTA OF THE CHEST; CT OF THE ABDOMEN AND PELVIS WITH CONTRAST 3/17/2019 12:29  pm; 3/17/2019 1:37 pm    TECHNIQUE:  CTA of the chest, abdomen and pelvis was performed after the administration  of intravenous contrast.  Multiplanar reformatted images are provided for  review.  Dose modulation, iterative reconstruction, and/or weight based  adjustment of the mA/kV was utilized to reduce the radiation dose to as low  as reasonably achievable.     COMPARISON:  CT chest 02/20/2014    HISTORY:  ORDERING SYSTEM PROVIDED HISTORY: Syncope, Evaluate for PE, left rib pain  after fall, evaluate for fracture  TECHNOLOGIST PROVIDED HISTORY:  Ordering Physician Provided Reason for Exam: Syncope, Evaluate for PE, left  rib pain after fall, evalaute for fracture  Acuity: Acute  Type of Exam: Initial  Additional signs and symptoms: none  Relevant Medical/Surgical History: 100ml isovue 370/ gfr>60; ORDERING SYSTEM  PROVIDED HISTORY: LUQ abdominal pain  TECHNOLOGIST PROVIDED HISTORY:  Additional Contrast?->None  Ordering Physician Provided Reason for Exam: LUQ abdominal pain  Acuity: Acute  Type of Exam: Initial  Additional signs and symptoms: none  Relevant Medical/Surgical History: contrast used from cta chest    FINDINGS:    Chest:    Pulmonary Arteries: Pulmonary arteries are adequately opacified for  evaluation. No evidence of intraluminal filling defect to suggest pulmonary  embolism.  Main pulmonary artery is normal in caliber. Mediastinum: No evidence of mediastinal lymphadenopathy.  The heart and  pericardium demonstrate no acute abnormality.  There is no acute abnormality  of the thoracic aorta. Lungs/pleura: Central airways are clear.  There are multiple, ground-glass  nodular opacities throughout the lungs.  No evidence of pulmonary edema.  No  pleural effusion or pneumothorax. Soft Tissues/Bones: No acute bone or soft tissue abnormality. Abdomen/Pelvis:    Organs:   Liver is normal in morphology. No biliary duct dilatation.  Post  cholecystectomy.  Spleen, adrenal glands, and pancreas are normal.  Kidneys  are symmetric in size and enhancement.   No hydronephrosis. GI/Bowel: Stomach, small bowel, and colon are nondilated. Post  appendectomy. Submucosal fat deposition involving the ascending and proximal  transverse colon is consistent with prior inflammatory process. Pelvis: Urinary bladder is unremarkable.  Uterus is surgically absent.  No  free fluid in the pelvis. Peritoneum/Retroperitoneum:   Abdominal aorta is normal in caliber.  No  abdominal or retroperitoneal adenopathy. No free fluid in the abdomen. Bones/Soft Tissues: No acute osseous abnormality.     Impression:       No evidence of pulmonary embolism. Multiple, bilateral ground-glass nodular opacities may reflect acute  infectious process or atypical pneumonia.     No acute traumatic abnormality of the abdomen.       Normal sinus rhythm   Nonspecific ST abnormality   Abnormal ECG   No previous ECGs available   Confirmed by [facility] Juan J MENCHACA ([id number]) on 3/17/2019 11:31:21 AM Electronically signed by KARTIK Liu NP on 3/17/2019 at 3:48 PM

## 2019-03-17 NOTE — ED NOTES
Patient medicated and sent to [address] [place] [address]  SANGITA Summers, ELSIE  03/17/19 1300

## 2019-03-18 ENCOUNTER — ANESTHESIA EVENT (OUTPATIENT)
Dept: ENDOSCOPY | Age: 59
DRG: 314 | End: 2019-03-18
Payer: COMMERCIAL

## 2019-03-18 LAB
ANION GAP SERPL CALCULATED.3IONS-SCNC: 9 MMOL/L (ref 4–16)
BASOPHILS ABSOLUTE: 0 K/CU MM
BASOPHILS RELATIVE PERCENT: 0.3 % (ref 0–1)
BUN BLDV-MCNC: 17 MG/DL (ref 6–23)
CALCIUM SERPL-MCNC: 8.5 MG/DL (ref 8.3–10.6)
CHLORIDE BLD-SCNC: 104 MMOL/L (ref 99–110)
CO2: 27 MMOL/L (ref 21–32)
CREAT SERPL-MCNC: 1 MG/DL (ref 0.6–1.1)
DIFFERENTIAL TYPE: ABNORMAL
EKG ATRIAL RATE: 67 BPM
EKG DIAGNOSIS: NORMAL
EKG P AXIS: 44 DEGREES
EKG P-R INTERVAL: 138 MS
EKG Q-T INTERVAL: 438 MS
EKG QRS DURATION: 84 MS
EKG QTC CALCULATION (BAZETT): 462 MS
EKG R AXIS: 52 DEGREES
EKG T AXIS: 27 DEGREES
EKG VENTRICULAR RATE: 67 BPM
EOSINOPHILS ABSOLUTE: 0 K/CU MM
EOSINOPHILS RELATIVE PERCENT: 0.7 % (ref 0–3)
GFR AFRICAN AMERICAN: >60 ML/MIN/1.73M2
GFR NON-AFRICAN AMERICAN: 57 ML/MIN/1.73M2
GLUCOSE BLD-MCNC: 107 MG/DL (ref 70–99)
GLUCOSE BLD-MCNC: 114 MG/DL (ref 70–99)
HCT VFR BLD CALC: 35.9 % (ref 37–47)
HEMOGLOBIN: 11.3 GM/DL (ref 12.5–16)
IMMATURE NEUTROPHIL %: 1.8 % (ref 0–0.43)
LV EF: 55 %
LVEF MODALITY: NORMAL
LYMPHOCYTES ABSOLUTE: 1.3 K/CU MM
LYMPHOCYTES RELATIVE PERCENT: 21 % (ref 24–44)
MCH RBC QN AUTO: 30.8 PG (ref 27–31)
MCHC RBC AUTO-ENTMCNC: 31.5 % (ref 32–36)
MCV RBC AUTO: 97.8 FL (ref 78–100)
MONOCYTES ABSOLUTE: 0.5 K/CU MM
MONOCYTES RELATIVE PERCENT: 8.7 % (ref 0–4)
NUCLEATED RBC %: 0 %
PDW BLD-RTO: 13 % (ref 11.7–14.9)
PLATELET # BLD: 249 K/CU MM (ref 140–440)
PMV BLD AUTO: 9 FL (ref 7.5–11.1)
POTASSIUM SERPL-SCNC: 3.9 MMOL/L (ref 3.5–5.1)
RBC # BLD: 3.67 M/CU MM (ref 4.2–5.4)
SEGMENTED NEUTROPHILS ABSOLUTE COUNT: 4 K/CU MM
SEGMENTED NEUTROPHILS RELATIVE PERCENT: 67.5 % (ref 36–66)
SODIUM BLD-SCNC: 140 MMOL/L (ref 135–145)
TOTAL IMMATURE NEUTOROPHIL: 0.11 K/CU MM
TOTAL NUCLEATED RBC: 0 K/CU MM
WBC # BLD: 6 K/CU MM (ref 4–10.5)

## 2019-03-18 PROCEDURE — 93005 ELECTROCARDIOGRAM TRACING: CPT | Performed by: INTERNAL MEDICINE

## 2019-03-18 PROCEDURE — 6370000000 HC RX 637 (ALT 250 FOR IP): Performed by: NURSE PRACTITIONER

## 2019-03-18 PROCEDURE — 2500000003 HC RX 250 WO HCPCS: Performed by: NURSE PRACTITIONER

## 2019-03-18 PROCEDURE — 6360000002 HC RX W HCPCS: Performed by: NURSE PRACTITIONER

## 2019-03-18 PROCEDURE — G0378 HOSPITAL OBSERVATION PER HR: HCPCS

## 2019-03-18 PROCEDURE — 6360000002 HC RX W HCPCS: Performed by: FAMILY MEDICINE

## 2019-03-18 PROCEDURE — 80048 BASIC METABOLIC PNL TOTAL CA: CPT

## 2019-03-18 PROCEDURE — 96376 TX/PRO/DX INJ SAME DRUG ADON: CPT

## 2019-03-18 PROCEDURE — 96372 THER/PROPH/DIAG INJ SC/IM: CPT

## 2019-03-18 PROCEDURE — 36415 COLL VENOUS BLD VENIPUNCTURE: CPT

## 2019-03-18 PROCEDURE — 93306 TTE W/DOPPLER COMPLETE: CPT

## 2019-03-18 PROCEDURE — 96365 THER/PROPH/DIAG IV INF INIT: CPT

## 2019-03-18 PROCEDURE — 2580000003 HC RX 258: Performed by: NURSE PRACTITIONER

## 2019-03-18 PROCEDURE — 2580000003 HC RX 258: Performed by: FAMILY MEDICINE

## 2019-03-18 PROCEDURE — 1200000000 HC SEMI PRIVATE

## 2019-03-18 PROCEDURE — 85025 COMPLETE CBC W/AUTO DIFF WBC: CPT

## 2019-03-18 PROCEDURE — 93010 ELECTROCARDIOGRAM REPORT: CPT | Performed by: INTERNAL MEDICINE

## 2019-03-18 PROCEDURE — 82962 GLUCOSE BLOOD TEST: CPT

## 2019-03-18 RX ORDER — DICYCLOMINE HYDROCHLORIDE 10 MG/ML
20 INJECTION INTRAMUSCULAR 4 TIMES DAILY
Status: DISCONTINUED | OUTPATIENT
Start: 2019-03-18 | End: 2019-03-19 | Stop reason: HOSPADM

## 2019-03-18 RX ORDER — MORPHINE SULFATE 4 MG/ML
4 INJECTION, SOLUTION INTRAMUSCULAR; INTRAVENOUS
Status: DISCONTINUED | OUTPATIENT
Start: 2019-03-18 | End: 2019-03-18

## 2019-03-18 RX ORDER — MORPHINE SULFATE 4 MG/ML
2 INJECTION, SOLUTION INTRAMUSCULAR; INTRAVENOUS
Status: DISCONTINUED | OUTPATIENT
Start: 2019-03-18 | End: 2019-03-18

## 2019-03-18 RX ORDER — LEVOTHYROXINE SODIUM 0.12 MG/1
125 TABLET ORAL DAILY
Status: DISCONTINUED | OUTPATIENT
Start: 2019-03-19 | End: 2019-03-19 | Stop reason: HOSPADM

## 2019-03-18 RX ADMIN — DICYCLOMINE HYDROCHLORIDE 20 MG: 20 INJECTION, SOLUTION INTRAMUSCULAR at 12:32

## 2019-03-18 RX ADMIN — FAMOTIDINE 20 MG: 10 INJECTION, SOLUTION INTRAVENOUS at 10:02

## 2019-03-18 RX ADMIN — DEXTROSE MONOHYDRATE 1 G: 5 INJECTION INTRAVENOUS at 12:32

## 2019-03-18 RX ADMIN — GABAPENTIN 300 MG: 300 CAPSULE ORAL at 23:20

## 2019-03-18 RX ADMIN — LEVOTHYROXINE SODIUM 100 MCG: 100 TABLET ORAL at 06:25

## 2019-03-18 RX ADMIN — IBUPROFEN 600 MG: 600 TABLET, FILM COATED ORAL at 10:03

## 2019-03-18 RX ADMIN — PREDNISONE 10 MG: 10 TABLET ORAL at 10:02

## 2019-03-18 RX ADMIN — HYDROMORPHONE HYDROCHLORIDE 1 MG: 2 TABLET ORAL at 04:51

## 2019-03-18 RX ADMIN — SODIUM CHLORIDE: 9 INJECTION, SOLUTION INTRAVENOUS at 06:00

## 2019-03-18 RX ADMIN — DICYCLOMINE HYDROCHLORIDE 20 MG: 20 INJECTION, SOLUTION INTRAMUSCULAR at 23:19

## 2019-03-18 RX ADMIN — ENOXAPARIN SODIUM 40 MG: 40 INJECTION SUBCUTANEOUS at 10:02

## 2019-03-18 RX ADMIN — FAMOTIDINE 20 MG: 10 INJECTION, SOLUTION INTRAVENOUS at 23:19

## 2019-03-18 RX ADMIN — VALSARTAN 320 MG: 160 TABLET, FILM COATED ORAL at 10:02

## 2019-03-18 RX ADMIN — SODIUM CHLORIDE, PRESERVATIVE FREE 10 ML: 5 INJECTION INTRAVENOUS at 23:20

## 2019-03-18 RX ADMIN — AZITHROMYCIN MONOHYDRATE 500 MG: 500 INJECTION, POWDER, LYOPHILIZED, FOR SOLUTION INTRAVENOUS at 13:12

## 2019-03-18 RX ADMIN — DICYCLOMINE HYDROCHLORIDE 20 MG: 20 INJECTION, SOLUTION INTRAMUSCULAR at 17:57

## 2019-03-18 RX ADMIN — IBUPROFEN 600 MG: 600 TABLET, FILM COATED ORAL at 17:56

## 2019-03-18 RX ADMIN — GABAPENTIN 300 MG: 300 CAPSULE ORAL at 10:02

## 2019-03-18 ASSESSMENT — PAIN SCALES - GENERAL
PAINLEVEL_OUTOF10: 5
PAINLEVEL_OUTOF10: 7
PAINLEVEL_OUTOF10: 6
PAINLEVEL_OUTOF10: 7
PAINLEVEL_OUTOF10: 5

## 2019-03-18 ASSESSMENT — ENCOUNTER SYMPTOMS: SHORTNESS OF BREATH: 1

## 2019-03-18 NOTE — CONSULTS
[facility]  Gastroenterology Consultation    3/18/2019  8:20 AM    Patient:    Jared Quezada  : 1960   66 y.o. MRN: [id number]  Admitted: 3/17/2019 11:10 AM ATT: Terri Hammonds MD   [id number]  AdmitDx: Syncope and collapse [R55]  Left upper quadrant pain [R10.12]  Syncope, unspecified syncope type [R55]  Hypothyroidism, unspecified type [E03.9]  PCP: Tana Smyth MD    Reason for Consult:  Abdominal pain    Requesting Physician:  Terri Hammonds MD      History Obtained From:  Patient and review of all records    HISTORY OF PRESENT ILLNESS:                The patient is a 66 y.o. female presented to [facility] 3/17 due to syncopal episode. The patient states that she passed out twice today at home. She was in the shower initially and felt lightheaded and passed out. She denies being injured with this episode. She then passed out again and so family called EMS. She states she passed out 2 other times this week. Reports constant LUQ pain. Pain radiates left side of chest for the past week. Worse when mobile and with po intake. Becomes \"burning ball\". No relieving factors. Less pain while lying in bed. Abdominal bloating. Decreased appetite for the past week. Has taken Nexium OTC with no relief. Disseminated histoplasmosis, critically ill. Transfer to [address] [place] [address]. Hx of fibromyalgia and recently RA. Recently started on Prednisone and methotrexate. Hx of gastric ulcer as teenager. Hx of GERD. EGD  revealed gastritis. Colonoscopy  revealed internal hemorrhoids. CT scan chest/abd/pelvis with contrast revealed   No evidence of pulmonary embolism.       Multiple, bilateral ground-glass nodular opacities may reflect acute   infectious process or atypical pneumonia.       No acute traumatic abnormality of the abdomen.      One emesis recently, Thursday evening-food contents/bilous  Denies dysphagia   Yellow loose stool, two early am today     Hx of NSAIDs use, 1-2 days per week    Non-smoker  Drinks a glass of wine twice per month     Past Medical History:        Diagnosis Date    Anxiety     \"work related\"    Arthritis     Chest pain     Fatigue     Fatigue     \"increased fatigue, random fever, headaches- doing lumbar puncture to check this\"(scheduled 9/11/2013)    Generalized anxiety disorder     GERD (gastroesophageal reflux disease)     Occasionally    H/O cardiovascular stress test 1/13/2014    cardiolite-no ischemia, EF 70%,normal    H/O CT scan of chest 12/23/13    Right upper lobe pulmonary nodule continues to shrink in size. Mediastinal and hilar adenopathy  are present but also significant improved.  Histoplasmosis     History of cardiac monitoring 1/13/14    Event - NSR    History of echocardiogram 1/13/2014    EF 55% mild TR    History of histoplasmosis     History of hysterectomy     History of kidney stones     \"8 yrs ago- passed them on my own\"    History of lymph node biopsy     Hypertension     Hypothyroidism     Kidney stones     Migraines     \"have had a constant migraine for the past 6 weeks, gets worse at times but never goes away\"    Mild tricuspid regurgitation 1/13/2014    Peptic ulcer     Peptic ulcer     As a teenager.  Shortness of breath     SOB (shortness of breath)     Syncope and collapse     Thyroid disease        Past Surgical History:        Procedure Laterality Date    APPENDECTOMY      APPENDECTOMY  1979(pc)    CHOLECYSTECTOMY      CHOLECYSTECTOMY  2008(pc)    Lap Choley    COLONOSCOPY  2008    DILATION AND CURETTAGE OF UTERUS  2008    ENDOSCOPY, COLON, DIAGNOSTIC  2008    HYSTERECTOMY  2008    per old chart pt had exp.  laparotomy with lysis of adhesions and JACK/BSO done 2008(pc)    KNEE SURGERY Left 11/2012    \"ACL surgery with a scope\"    MEDIASTINOSCOPY  11/05/2013    OTHER SURGICAL HISTORY  11 05 2013    medianstinoscopy    JACK AND BSO           Current Medications:    Medications    Scheduled Medications:    sodium chloride flush  10 mL Intravenous 2 times per day    enoxaparin  40 mg Subcutaneous Daily    famotidine (PEPCID) injection  20 mg Intravenous BID    gabapentin  300 mg Oral BID    levothyroxine  100 mcg Oral Daily    [START ON 3/19/2019] methotrexate  12.5 mg Oral Weekly    predniSONE  10 mg Oral Daily    valsartan  320 mg Oral Daily    ibuprofen  600 mg Oral TID WC     PRN Medications: sodium chloride flush, magnesium hydroxide, ondansetron, acetaminophen, HYDROmorphone      Allergies: Other; Pcn [penicillins]; and Pcn [penicillins]    Social History:   TOBACCO:   reports that she has never smoked. She has never used smokeless tobacco.  ETOH:   reports that she drinks alcohol. Family History:       Problem Relation Age of Onset    Other Mother         lung neoplasm, malignant    Cancer Mother         lung ca    Heart Disease Mother     Other Father         lymphoma    Cancer Father         lymphoma    High Blood Pressure Sister     Heart Disease Sister     Stroke Sister         tia    Diabetes Sister     Heart Disease Maternal Grandfather        No family history of colon cancer, Crohn's disease, or ulcerative colitis. REVIEW OF SYSTEMS:    The positive ROS will be identified in bold, otherwise ROS are negative     CONSTITUTIONAL:  Neg   Recent weight changes, fever, chills or night sweats, + fatigue   EYES:  Neg  Blurriness, earing, itching or acute change in vision  EARS:  Neg  hearing loss, tinnitus, vertigo, discharge or earache. NOSE:  Neg  Rhinorrhea, sneezing, itching, allergy or epistaxis  MOUTH/THROAT:  Neg  bleeding gums, hoarseness or sore throat.   RESPIRATORY:   Neg SOB, wheeze, cough, sputum, hemoptysis or bronchitis  CARDIOVASCULAR:  Neg chest pain, palpitations, dyspnea on exertion, orthopnea, paroxysmal nocturnal dyspnea or edema  GASTROINTESTINAL:  SEE HPI  GENITOURINARY:  Neg  Urinary frequency, hesitancy, urgency, polyuria, dysuria, hematuria, nocturia, or incontinence. HEMATOLOGIC/LYMPHATIC:  Neg  Anemia, bleeding tendency  MUSCULOSKELETAL:    New myalgias, bone pain, joint pain, swelling or stiffness and has had change in gait. NEUROLOGICAL:  Neg  Loss of Consciousness, memory loss, forgetfulness, periods of confusion, difficulty concentrating, seizures, decline in intellect, nervousness, insomina, aphasia or dysarthria. SKIN:  Neg  skin or hair changes, and has no itching, rashes, or sores. PSYCHIATRIC:  Neg depression, personality changes, anxiety. ENDOCRINE:  Neg polydipsia, polyuria, abnormal weight changes, heat /cold intolerance.   ALL/IMM:  Neg reactions to drugs other than listed    PHYSICAL EXAM:      Vitals:    /71   Pulse 52   Temp 98.1 °F (36.7 °C) (Oral)   Resp 15   Ht 5' 5\" (1.651 m)   Wt 292 lb 5.3 oz (132.6 kg)   SpO2 93%   BMI 48.65 kg/m²     General Appearance:    Alert, cooperative, no distress, appears stated age   HEENT:    Normocephalic, atraumatic, Conjunctiva clear, Lips, mucosa, and tongue normal; teeth and gums normal   Neck:   Supple, symmetrical, trachea midline   Lungs:     Clear to auscultation bilaterally, respirations unlabored   Chest Wall:    No tenderness or deformity    Heart:    Regular rate and rhythm, S1 and S2 normal, no murmur, rub   or gallop   Abdomen:     Soft, diffuse tenderness, bowel sounds active all four quadrants   Rectal:    Deferred   Extremities:   Extremities normal, atraumatic, no cyanosis or edema   Pulses:   2+ and symmetric all extremities   Skin:   Skin color, texture, turgor normal, no rashes or lesions   Lymph nodes:   No abnormality   Neurologic:   No focal deficits, moving all four extremities            DATA:    ABGs: No results found for: PHART, PO2ART, HGO6XFS  CBC:   Recent Labs     03/17/19  1138 03/18/19  0348   WBC 5.8 6.0   HGB 12.8 11.3*    249     BMP:    Recent Labs     03/17/19  1138 03/18/19  0348    140   K 4.0 3.9    104   CO2 23 27   BUN 22 17   CREATININE 1.0 1.0   GLUCOSE 106* 107*     Magnesium:   Lab Results   Component Value Date    MG 2.4 01/15/2014     Hepatic:   Recent Labs     03/17/19  1138   AST 71*   ALT 94*   BILITOT 1.0   ALKPHOS 117     No results for input(s): LIPASE, AMYLASE in the last 72 hours. No results for input(s): PROTIME, INR in the last 72 hours. No results for input(s): PTT in the last 72 hours. Lipids: No results for input(s): CHOL, HDL in the last 72 hours. Invalid input(s): LDLCALCU  INR: No results for input(s): INR in the last 72 hours. TSH: No results found for: TSH  No intake or output data in the 24 hours ending 03/18/19 0820   sodium chloride 100 mL/hr at 03/18/19 0600       Imaging Studies:    Reviewed     IMPRESSION:      Patient Active Problem List   Diagnosis Code    Chest pain R07.9    Hypothyroidism E03.9    Mediastinal lymphadenopathy R59.0    Lung nodule R91.1    Intractable back pain M54.9    Histoplasmosis B39.9    Acute pericarditis I30.9    Factitious disorder F68.10    Disseminated histoplasmosis B39.9    Depression F32.9    Hypothyroidism E03.9    Hypertension I10    History of hysterectomy Z90.710    History of histoplasmosis Z86.19    Chest pain R07.9    Fatigue R53.83    Kidney stones N20.0    Peptic ulcer K27.9    Syncope and collapse R55       RECOMMENDATIONS:    LUQ abdominal pain:  New onset one week ago, now c/o diffuse tenderness  Has bloating too  Longstanding hx of GERD, not controlled taking Nexium OTC  Will plan EGD in am @ 0700 to evaluate for PUD, NPO after MN  May have dyspepsia  Continue Pepcid 20 mg IV BID  Patient denies dysphagia     Diarrhea:  New onset  May be viral illness  Recommend stool culture, c-diff and O&P  Colonoscopy 2009  Will plan colonoscopy in near future as outpatient     Discussed plan of care with patient      Patient clinical, biochemical, and radiological information discussed with Dr. Thomas Hilario. He agrees with the assessment and plan. Kenyatta Mansfield, CNP  3/18/2019  8:20 AM     She was started on MTX and prednisone one month ago for RA. For the past week, has been having abdominal pain, LUQ progressing to diffuse. Not Felty's syndrome or pancreatitis. Plan for EGD to r/o PUD. No significant findings on CT scan abdomen. I have seen and examined this patient personally, independently of the nurse practitioner. I have reviewed the hospital care given to date and reviewed all pertinent labs and imaging. The plan was developed mutually at the time of the visit with the patient. Sangeetha Nava and myself. I have spoken with patient, nursing staff and provided written and verbal instructions . The above note has sammy reviewed and I agree with the assessment, diagnosis, and treatment plan with changes made by me as above and as suggested by Sangeetha Nava CNP.     [facility]

## 2019-03-18 NOTE — PROGRESS NOTES
Hospitalist Progress Note      Name:  Digna Lewis /Age/Sex: 1960  (66 y.o. female)   MRN & CSN:  [id number] & [id number] Admission Date/Time: 3/17/2019 11:10 AM   Location:  [address] [place] [address] PCP: Delphine Barakat MD       Digna Lewis is a 66 y.o.  female  who presents with Loss of Consciousness (pt states fell in shower and has ABD pain and left sided rib pain) and Abdominal Pain (LUQ)      Assessment and Plan:   Syncope and Collapse  - echo pending  - check orthostatics  - carotid duplex: 0-50% stenosis b/l    Possible CAP  - does report cough and sick contacts so will tx  - hx of histoplasmosis in past  - start rocephin + Zithromax  - check urine strep and legionella  - sputum cx  - CTA: neg PE    Abd Pain  - clear liquids  - paim mx  - CT abd/pelvis non acute  - GI consulted, possible EGD    Uncontrolled Hypothyroidism  - increase synthroid to 125, f/u TSH in 6 weeks with PCP                Diet DIET CLEAR LIQUID;   Code Status Full Code     Medications:   Medications:    sodium chloride flush  10 mL Intravenous 2 times per day    enoxaparin  40 mg Subcutaneous Daily    famotidine (PEPCID) injection  20 mg Intravenous BID    gabapentin  300 mg Oral BID    levothyroxine  100 mcg Oral Daily    [START ON 3/19/2019] methotrexate  12.5 mg Oral Weekly    predniSONE  10 mg Oral Daily    valsartan  320 mg Oral Daily    ibuprofen  600 mg Oral TID WC      Infusions:    sodium chloride 100 mL/hr at 19 0600     PRN Meds:   sodium chloride flush 10 mL PRN   magnesium hydroxide 30 mL Daily PRN   ondansetron 4 mg Q6H PRN   acetaminophen 650 mg Q4H PRN   HYDROmorphone 1 mg Q4H PRN     Subjective:   Doing okay, no overt pain, getting echo done    Objective:   No intake or output data in the 24 hours ending 19 1002   Vitals:   Vitals:    19 0214   BP: 113/71   Pulse: 52   Resp: 15   Temp: 98.1 °F (36.7 °C)   SpO2: 93%     Physical Exam:   Gen:  awake, alert, cooperative, no apparent

## 2019-03-18 NOTE — PROGRESS NOTES
Pt was complaining of pain radiating around to her back and pain creeping up into her chest area. She said she was having issues last week and didn't do anything about it. So I ran an EKG on the pt looks like NSR and her glucose is 114. Her trops have been negative. Informed Dr. Jesus Truong of the pain and he informed me to pass it along to day shift so they can follow up and do more testing in the am. Pt is resting in bed.

## 2019-03-18 NOTE — PLAN OF CARE
Problem: Falls - Risk of:  Goal: Will remain free from falls  Description  Will remain free from falls  3/18/2019 0801 by Marianela Jo RN  Outcome: Ongoing     Problem: Falls - Risk of:  Goal: Absence of physical injury  Description  Absence of physical injury  3/18/2019 0801 by Marianela Jo RN  Outcome: Ongoing     Problem: Pain:  Goal: Pain level will decrease  Description  Pain level will decrease  3/18/2019 0801 by Marianela Jo RN  Outcome: Ongoing     Problem: Pain:  Goal: Control of acute pain  Description  Control of acute pain  3/18/2019 0801 by Marianela Jo RN  Outcome: Ongoing     Problem: Pain:  Goal: Control of chronic pain  Description  Control of chronic pain  3/18/2019 0801 by Marianela Jo RN  Outcome: Ongoing     Problem: Activity:  Goal: Risk for activity intolerance will decrease  Description  Risk for activity intolerance will decrease  Outcome: Ongoing     Problem: Bowel/Gastric:  Goal: Bowel function will improve  Description  Bowel function will improve  Outcome: Ongoing     Problem: Bowel/Gastric:  Goal: Diagnostic test results will improve  Description  Diagnostic test results will improve  Outcome: Ongoing     Problem: Bowel/Gastric:  Goal: Occurrences of nausea will decrease  Description  Occurrences of nausea will decrease  Outcome: Ongoing     Problem:  Bowel/Gastric:  Goal: Occurrences of vomiting will decrease  Description  Occurrences of vomiting will decrease  Outcome: Ongoing     Problem: Fluid Volume:  Goal: Maintenance of adequate hydration will improve  Description  Maintenance of adequate hydration will improve  Outcome: Ongoing     Problem: Health Behavior:  Goal: Ability to state signs and symptoms to report to health care provider will improve  Description  Ability to state signs and symptoms to report to health care provider will improve  Outcome: Ongoing     Problem: Physical Regulation:  Goal: Complications related to the disease process, condition or treatment will be avoided or minimized  Description  Complications related to the disease process, condition or treatment will be avoided or minimized  Outcome: Ongoing     Problem: Physical Regulation:  Goal: Ability to maintain clinical measurements within normal limits will improve  Description  Ability to maintain clinical measurements within normal limits will improve  Outcome: Ongoing     Problem: Sensory:  Goal: Pain level will decrease  Description  Pain level will decrease  3/18/2019 0801 by Shanit Murdock RN    Problem: Sensory:  Goal: Ability to identify factors that increase the pain will improve  Description  Ability to identify factors that increase the pain will improve  Outcome: Ongoing     Problem: Sensory:  Goal: Ability to notify healthcare provider of pain before it becomes unmanageable or unbearable will improve  Description  Ability to notify healthcare provider of pain before it becomes unmanageable or unbearable will improve  Outcome: Ongoing

## 2019-03-19 ENCOUNTER — ANESTHESIA (OUTPATIENT)
Dept: ENDOSCOPY | Age: 59
DRG: 314 | End: 2019-03-19
Payer: COMMERCIAL

## 2019-03-19 VITALS
OXYGEN SATURATION: 96 % | TEMPERATURE: 98.1 F | BODY MASS INDEX: 48.82 KG/M2 | RESPIRATION RATE: 11 BRPM | HEIGHT: 65 IN | WEIGHT: 293 LBS | HEART RATE: 57 BPM | SYSTOLIC BLOOD PRESSURE: 119 MMHG | DIASTOLIC BLOOD PRESSURE: 66 MMHG

## 2019-03-19 VITALS — OXYGEN SATURATION: 100 % | SYSTOLIC BLOOD PRESSURE: 120 MMHG | DIASTOLIC BLOOD PRESSURE: 62 MMHG

## 2019-03-19 PROCEDURE — G0378 HOSPITAL OBSERVATION PER HR: HCPCS

## 2019-03-19 PROCEDURE — 3700000001 HC ADD 15 MINUTES (ANESTHESIA): Performed by: INTERNAL MEDICINE

## 2019-03-19 PROCEDURE — 6360000002 HC RX W HCPCS: Performed by: FAMILY MEDICINE

## 2019-03-19 PROCEDURE — 3609012400 HC EGD TRANSORAL BIOPSY SINGLE/MULTIPLE: Performed by: INTERNAL MEDICINE

## 2019-03-19 PROCEDURE — 0DB78ZX EXCISION OF STOMACH, PYLORUS, VIA NATURAL OR ARTIFICIAL OPENING ENDOSCOPIC, DIAGNOSTIC: ICD-10-PCS | Performed by: INTERNAL MEDICINE

## 2019-03-19 PROCEDURE — 96372 THER/PROPH/DIAG INJ SC/IM: CPT

## 2019-03-19 PROCEDURE — 6370000000 HC RX 637 (ALT 250 FOR IP): Performed by: FAMILY MEDICINE

## 2019-03-19 PROCEDURE — 3700000000 HC ANESTHESIA ATTENDED CARE: Performed by: INTERNAL MEDICINE

## 2019-03-19 PROCEDURE — 2580000003 HC RX 258: Performed by: NURSE ANESTHETIST, CERTIFIED REGISTERED

## 2019-03-19 PROCEDURE — 0DB98ZX EXCISION OF DUODENUM, VIA NATURAL OR ARTIFICIAL OPENING ENDOSCOPIC, DIAGNOSTIC: ICD-10-PCS | Performed by: INTERNAL MEDICINE

## 2019-03-19 PROCEDURE — 6360000002 HC RX W HCPCS: Performed by: NURSE PRACTITIONER

## 2019-03-19 PROCEDURE — 6360000002 HC RX W HCPCS: Performed by: NURSE ANESTHETIST, CERTIFIED REGISTERED

## 2019-03-19 PROCEDURE — 2580000003 HC RX 258: Performed by: FAMILY MEDICINE

## 2019-03-19 PROCEDURE — 2500000003 HC RX 250 WO HCPCS: Performed by: NURSE PRACTITIONER

## 2019-03-19 PROCEDURE — 2709999900 HC NON-CHARGEABLE SUPPLY: Performed by: INTERNAL MEDICINE

## 2019-03-19 PROCEDURE — 6370000000 HC RX 637 (ALT 250 FOR IP): Performed by: NURSE PRACTITIONER

## 2019-03-19 PROCEDURE — 88305 TISSUE EXAM BY PATHOLOGIST: CPT

## 2019-03-19 PROCEDURE — 88342 IMHCHEM/IMCYTCHM 1ST ANTB: CPT

## 2019-03-19 PROCEDURE — 96376 TX/PRO/DX INJ SAME DRUG ADON: CPT

## 2019-03-19 PROCEDURE — 2500000003 HC RX 250 WO HCPCS: Performed by: NURSE ANESTHETIST, CERTIFIED REGISTERED

## 2019-03-19 RX ORDER — 0.9 % SODIUM CHLORIDE 0.9 %
1000 INTRAVENOUS SOLUTION INTRAVENOUS ONCE
Status: COMPLETED | OUTPATIENT
Start: 2019-03-19 | End: 2019-03-19

## 2019-03-19 RX ORDER — DICYCLOMINE HCL 20 MG
20 TABLET ORAL 3 TIMES DAILY PRN
Qty: 120 TABLET | Refills: 3 | Status: SHIPPED | OUTPATIENT
Start: 2019-03-19

## 2019-03-19 RX ORDER — LIDOCAINE HYDROCHLORIDE 20 MG/ML
INJECTION, SOLUTION INFILTRATION; PERINEURAL PRN
Status: DISCONTINUED | OUTPATIENT
Start: 2019-03-19 | End: 2019-03-19 | Stop reason: SDUPTHER

## 2019-03-19 RX ORDER — SODIUM CHLORIDE 9 MG/ML
INJECTION, SOLUTION INTRAVENOUS CONTINUOUS PRN
Status: DISCONTINUED | OUTPATIENT
Start: 2019-03-19 | End: 2019-03-19 | Stop reason: SDUPTHER

## 2019-03-19 RX ORDER — PANTOPRAZOLE SODIUM 40 MG/1
40 TABLET, DELAYED RELEASE ORAL
Qty: 180 TABLET | Refills: 1 | Status: SHIPPED | OUTPATIENT
Start: 2019-03-19 | End: 2019-05-08

## 2019-03-19 RX ORDER — LEVOTHYROXINE SODIUM 0.12 MG/1
125 TABLET ORAL DAILY
Qty: 90 TABLET | Refills: 1 | Status: SHIPPED | OUTPATIENT
Start: 2019-03-19

## 2019-03-19 RX ORDER — LEVOFLOXACIN 500 MG/1
500 TABLET, FILM COATED ORAL DAILY
Qty: 7 TABLET | Refills: 0 | Status: SHIPPED | OUTPATIENT
Start: 2019-03-19 | End: 2019-03-26

## 2019-03-19 RX ORDER — PROPOFOL 10 MG/ML
INJECTION, EMULSION INTRAVENOUS PRN
Status: DISCONTINUED | OUTPATIENT
Start: 2019-03-19 | End: 2019-03-19 | Stop reason: SDUPTHER

## 2019-03-19 RX ADMIN — LIDOCAINE HYDROCHLORIDE 100 MG: 20 INJECTION, SOLUTION INFILTRATION; PERINEURAL at 07:32

## 2019-03-19 RX ADMIN — IBUPROFEN 600 MG: 600 TABLET, FILM COATED ORAL at 09:47

## 2019-03-19 RX ADMIN — PROPOFOL 40 MG: 10 INJECTION, EMULSION INTRAVENOUS at 07:34

## 2019-03-19 RX ADMIN — PROPOFOL 100 MG: 10 INJECTION, EMULSION INTRAVENOUS at 07:32

## 2019-03-19 RX ADMIN — LEVOTHYROXINE SODIUM 125 MCG: 0.12 TABLET ORAL at 13:24

## 2019-03-19 RX ADMIN — SODIUM CHLORIDE: 9 INJECTION, SOLUTION INTRAVENOUS at 07:08

## 2019-03-19 RX ADMIN — METHOTREXATE 12.5 MG: 2.5 TABLET ORAL at 09:47

## 2019-03-19 RX ADMIN — IBUPROFEN 600 MG: 600 TABLET, FILM COATED ORAL at 13:24

## 2019-03-19 RX ADMIN — GABAPENTIN 300 MG: 300 CAPSULE ORAL at 09:48

## 2019-03-19 RX ADMIN — SODIUM CHLORIDE 1000 ML: 9 INJECTION, SOLUTION INTRAVENOUS at 13:24

## 2019-03-19 RX ADMIN — DICYCLOMINE HYDROCHLORIDE 20 MG: 20 INJECTION, SOLUTION INTRAMUSCULAR at 09:47

## 2019-03-19 RX ADMIN — PREDNISONE 10 MG: 10 TABLET ORAL at 09:48

## 2019-03-19 RX ADMIN — ONDANSETRON 4 MG: 2 INJECTION INTRAMUSCULAR; INTRAVENOUS at 13:07

## 2019-03-19 RX ADMIN — DICYCLOMINE HYDROCHLORIDE 20 MG: 20 INJECTION, SOLUTION INTRAMUSCULAR at 13:24

## 2019-03-19 RX ADMIN — FAMOTIDINE 20 MG: 10 INJECTION, SOLUTION INTRAVENOUS at 09:47

## 2019-03-19 RX ADMIN — ENOXAPARIN SODIUM 40 MG: 40 INJECTION SUBCUTANEOUS at 09:48

## 2019-03-19 RX ADMIN — PROPOFOL 20 MG: 10 INJECTION, EMULSION INTRAVENOUS at 07:36

## 2019-03-19 ASSESSMENT — PAIN SCALES - GENERAL
PAINLEVEL_OUTOF10: 8
PAINLEVEL_OUTOF10: 5
PAINLEVEL_OUTOF10: 4

## 2019-03-19 NOTE — DISCHARGE SUMMARY
Caryle Cline 1960 [id number]  PCP:  Deven Lopez MD    Admit date: 3/17/2019  Admitting Physician: Aurelio Liao MD    Discharge date: 3/19/2019 Discharge Physician: Aurelio Liao MD         Hospital Course and Discharge Diagnoses Include:    Syncope and Collapse, could have been from hypotension  -stable, stopped losartan, IVF given  - echo: ef 55%  - check orthostatics: WNL  - carotid duplex: 0-50% stenosis b/l     Possible CAP  - does report cough and sick contacts so will tx  - hx of histoplasmosis in past  - start rocephin + Zithromax, d/c on oral levaquin x  7 days  - check urine strep and legionella  - sputum cx  - CTA: neg PE     Abd Pain  -stable, bentyl helped  - clear liquids, diet advacned  - paim mx  - CT abd/pelvis non acute  - GI consulted, EGD: gastritis noted, h.pylori and celiac bx done, PPI recommended and outpt c-scope     Uncontrolled Hypothyroidism  - increase synthroid to 125, f/u TSH in 6 weeks with PCP            Physical Exam on Discharge date: 03/19/19  Gen:  awake, alert, cooperative, no apparent distress  Head/Eyes:  Normocephalic atraumatic, EOMI   NECK:   symmetrical, trachea midline  LUNGS: Normal Effort   CARDIOVASCULAR:  Normal rate  ABDOMEN: Non tender, non distended, no HSM noted. MUSCULOSKELETAL:  ROM WNL  NEUROLOGIC: Alert and Oriented,  Cranial nerves II-XII are grossly intact. SKIN:  no bruising or bleeding, normal skin color,  no redness    Procedures:  See above  Xr Chest (2 Vw)    Result Date: 2/18/2019  EXAMINATION: TWO VIEWS OF THE CHEST 2/18/2019 5:16 pm COMPARISON: 02/20/2014 HISTORY: ORDERING SYSTEM PROVIDED HISTORY: Cervicalgia TECHNOLOGIST PROVIDED HISTORY: Ordering Physician Provided Reason for Exam: histoplasmosis Acuity: Chronic Type of Exam: Ongoing FINDINGS: No focal pulmonary opacities. No pleural effusion or pneumothorax. Heart size is within normal limits. No acute cardiopulmonary process.      Xr Cervical Spine (4-5 Views)    Result Date: 2/18/2019  EXAMINATION: 7 XRAY VIEWS OF THE CERVICAL SPINE 2/18/2019 5:16 pm COMPARISON: None. HISTORY: ORDERING SYSTEM PROVIDED HISTORY: Cervicalgia TECHNOLOGIST PROVIDED HISTORY: Ordering Physician Provided Reason for Exam: neck pain Acuity: Chronic Type of Exam: Ongoing FINDINGS: 7 views of the cervical spine were reviewed. No acute fracture identified. There straightening of the normal cervical lordosis. Multilevel mild-to-moderate spondylosis throughout the cervical spine. Findings most pronounced at C4-5 through C6-C7. The lateral masses of C1 are appropriately aligned with the dens on the open-mouth view. Surrounding soft tissues appear unremarkable. 1. No acute osseous abnormality of the cervical spine identified. 2. Multilevel mild-to-moderate spondylosis throughout the cervical spine. Xr Lumbar Spine (2-3 Views)    Result Date: 2/20/2019  EXAMINATION: 3 XRAY VIEWS OF THE LUMBAR SPINE 2/18/2019 5:16 pm COMPARISON: None. HISTORY: ORDERING SYSTEM PROVIDED HISTORY: Low back pain, unspecified back pain laterality, unspecified chronicity, with sciatica presence unspecified TECHNOLOGIST PROVIDED HISTORY: Ordering Physician Provided Reason for Exam: lumbar pain Acuity: Chronic Type of Exam: Ongoing FINDINGS: Sagittal alignment of the lumbar spine is maintained. No acute fracture or subluxation. Mild multilevel disc height loss and osteophytosis. Facet arthropathy is most significant at L5-S1. Cholecystectomy clips. No acute osseous abnormality. Mild multilevel degenerative changes. Ct Abdomen Pelvis W Iv Contrast Additional Contrast? None    Result Date: 3/17/2019  EXAMINATION: CTA OF THE CHEST; CT OF THE ABDOMEN AND PELVIS WITH CONTRAST 3/17/2019 12:29 pm; 3/17/2019 1:37 pm TECHNIQUE: CTA of the chest, abdomen and pelvis was performed after the administration of intravenous contrast.  Multiplanar reformatted images are provided for review.   Dose modulation, iterative reconstruction, and/or weight based adjustment of the mA/kV was utilized to reduce the radiation dose to as low as reasonably achievable. COMPARISON: CT chest 02/20/2014 HISTORY: ORDERING SYSTEM PROVIDED HISTORY: Syncope, Evaluate for PE, left rib pain after fall, evaluate for fracture TECHNOLOGIST PROVIDED HISTORY: Ordering Physician Provided Reason for Exam: Syncope, Evaluate for PE, left rib pain after fall, evalaute for fracture Acuity: Acute Type of Exam: Initial Additional signs and symptoms: none Relevant Medical/Surgical History: 100ml isovue 370/ gfr>60; ORDERING SYSTEM PROVIDED HISTORY: LUQ abdominal pain TECHNOLOGIST PROVIDED HISTORY: Additional Contrast?->None Ordering Physician Provided Reason for Exam: LUQ abdominal pain Acuity: Acute Type of Exam: Initial Additional signs and symptoms: none Relevant Medical/Surgical History: contrast used from cta chest FINDINGS: Chest: Pulmonary Arteries: Pulmonary arteries are adequately opacified for evaluation. No evidence of intraluminal filling defect to suggest pulmonary embolism. Main pulmonary artery is normal in caliber. Mediastinum: No evidence of mediastinal lymphadenopathy. The heart and pericardium demonstrate no acute abnormality. There is no acute abnormality of the thoracic aorta. Lungs/pleura: Central airways are clear. There are multiple, ground-glass nodular opacities throughout the lungs. No evidence of pulmonary edema. No pleural effusion or pneumothorax. Soft Tissues/Bones: No acute bone or soft tissue abnormality. Abdomen/Pelvis: Organs:   Liver is normal in morphology. No biliary duct dilatation. Post cholecystectomy. Spleen, adrenal glands, and pancreas are normal.  Kidneys are symmetric in size and enhancement. No hydronephrosis. GI/Bowel: Stomach, small bowel, and colon are nondilated. Post appendectomy. Submucosal fat deposition involving the ascending and proximal transverse colon is consistent with prior inflammatory process. Pelvis: Urinary bladder is unremarkable. Uterus is surgically absent. No free fluid in the pelvis. Peritoneum/Retroperitoneum:   Abdominal aorta is normal in caliber. No abdominal or retroperitoneal adenopathy. No free fluid in the abdomen. Bones/Soft Tissues: No acute osseous abnormality. No evidence of pulmonary embolism. Multiple, bilateral ground-glass nodular opacities may reflect acute infectious process or atypical pneumonia. No acute traumatic abnormality of the abdomen. Vl Dup Carotid Bilateral    Result Date: 3/19/2019  EXAMINATION: ULTRASOUND EVALUATION OF THE CAROTID ARTERIES 3/17/2019 COMPARISON: None HISTORY: ORDERING SYSTEM PROVIDED HISTORY: syncope with collapse TECHNOLOGIST PROVIDED HISTORY: Reason for exam:->syncope with collapse Acuity: Acute Type of Exam: Initial FINDINGS: RIGHT: The right common carotid artery demonstrates peak systolic velocities of 79 and 52 cm/sec in the proximal and distal segments respectively. The right internal carotid artery demonstrates the systolic velocities of 79, 88, and 113 cm/sec in the proximal, mid and distal segments respectively. The external carotid artery is patent. The vertebral artery demonstrates normal antegrade flow. Atherosclerotic plaque is present at the bifurcation. ICA/CCA ratio of 1.4. LEFT: The left common carotid artery demonstrates peak systolic velocities of 99, and 101 cm/sec in the proximal and distal segments respectively. The left internal carotid artery demonstrates the systolic velocities of 673, 96, and 105 cm/sec in the proximal, mid and distal segments respectively. The external carotid artery is patent. The vertebral artery demonstrates normal antegrade flow. Atherosclerotic plaque is present at the bifurcation. ICA/CCA ratio of 1.1.     1. The right internal carotid artery demonstrates 0-50% stenosis. 2. The left internal carotid artery demonstrates 0-50% stenosis.  3. Bilateral vertebral arteries are patent with flow in the normal direction. 4. Atherosclerotic plaque is present at the bilateral carotid bifurcation. Cta Pulmonary W Contrast    Result Date: 3/17/2019  EXAMINATION: CTA OF THE CHEST; CT OF THE ABDOMEN AND PELVIS WITH CONTRAST 3/17/2019 12:29 pm; 3/17/2019 1:37 pm TECHNIQUE: CTA of the chest, abdomen and pelvis was performed after the administration of intravenous contrast.  Multiplanar reformatted images are provided for review. Dose modulation, iterative reconstruction, and/or weight based adjustment of the mA/kV was utilized to reduce the radiation dose to as low as reasonably achievable. COMPARISON: CT chest 02/20/2014 HISTORY: ORDERING SYSTEM PROVIDED HISTORY: Syncope, Evaluate for PE, left rib pain after fall, evaluate for fracture TECHNOLOGIST PROVIDED HISTORY: Ordering Physician Provided Reason for Exam: Syncope, Evaluate for PE, left rib pain after fall, evalaute for fracture Acuity: Acute Type of Exam: Initial Additional signs and symptoms: none Relevant Medical/Surgical History: 100ml isovue 370/ gfr>60; ORDERING SYSTEM PROVIDED HISTORY: LUQ abdominal pain TECHNOLOGIST PROVIDED HISTORY: Additional Contrast?->None Ordering Physician Provided Reason for Exam: LUQ abdominal pain Acuity: Acute Type of Exam: Initial Additional signs and symptoms: none Relevant Medical/Surgical History: contrast used from cta chest FINDINGS: Chest: Pulmonary Arteries: Pulmonary arteries are adequately opacified for evaluation. No evidence of intraluminal filling defect to suggest pulmonary embolism. Main pulmonary artery is normal in caliber. Mediastinum: No evidence of mediastinal lymphadenopathy. The heart and pericardium demonstrate no acute abnormality. There is no acute abnormality of the thoracic aorta. Lungs/pleura: Central airways are clear. There are multiple, ground-glass nodular opacities throughout the lungs. No evidence of pulmonary edema. No pleural effusion or pneumothorax.  Soft Tissues/Bones: No acute bone or soft tissue abnormality. Abdomen/Pelvis: Organs:   Liver is normal in morphology. No biliary duct dilatation. Post cholecystectomy. Spleen, adrenal glands, and pancreas are normal.  Kidneys are symmetric in size and enhancement. No hydronephrosis. GI/Bowel: Stomach, small bowel, and colon are nondilated. Post appendectomy. Submucosal fat deposition involving the ascending and proximal transverse colon is consistent with prior inflammatory process. Pelvis: Urinary bladder is unremarkable. Uterus is surgically absent. No free fluid in the pelvis. Peritoneum/Retroperitoneum:   Abdominal aorta is normal in caliber. No abdominal or retroperitoneal adenopathy. No free fluid in the abdomen. Bones/Soft Tissues: No acute osseous abnormality. No evidence of pulmonary embolism. Multiple, bilateral ground-glass nodular opacities may reflect acute infectious process or atypical pneumonia. No acute traumatic abnormality of the abdomen.        Significant Diagnostic Studies at discharge:   CBC:   Lab Results   Component Value Date    WBC 6.0 03/18/2019    RBC 3.67 03/18/2019    HGB 11.3 03/18/2019    HCT 35.9 03/18/2019    MCV 97.8 03/18/2019    MCH 30.8 03/18/2019    MCHC 31.5 03/18/2019    RDW 13.0 03/18/2019     03/18/2019    MPV 9.0 03/18/2019       Patient Instructions:     Medication List      START taking these medications    dicyclomine 20 MG tablet  Commonly known as:  BENTYL  Take 1 tablet by mouth 3 times daily as needed (stomach spasms)     pantoprazole 40 MG tablet  Commonly known as:  PROTONIX  Take 1 tablet by mouth 2 times daily (before meals)        CONTINUE taking these medications    gabapentin 300 MG capsule  Commonly known as:  NEURONTIN     levothyroxine 88 MCG tablet  Commonly known as:  SYNTHROID     LODINE PO     methotrexate 2.5 MG chemo tablet  Commonly known as:  RHEUMATREX     predniSONE 10 MG tablet  Commonly known as:  Elirene Harperr        STOP taking these medications losartan 50 MG tablet  Commonly known as:  COZAAR     valsartan 320 MG tablet  Commonly known as:  DIOVAN           Where to Get Your Medications      These medications were sent to [facility] [address], [address] [place] [address] [address] Robert Velarde, [address]    Phone:  [phone]   · dicyclomine 20 MG tablet  · pantoprazole 40 MG tablet            Code Status: Full Code     Consults:   IP CONSULT TO HOSPITALIST  IP CONSULT TO GI  IP CONSULT TO GI    Diet: regular    Activity: activity as tolerated   Work:    Discharged Condition: good    Prognosis: Fair - Good    Disposition: home      Follow-up with   1. PCP within   5-7    Days    Follow up labs: none       Discharge Physician Signed: Elenita Grimes M.D. The patient was seen and examined on day of discharge and this discharge summary is in conjunction with any daily progress note from day of discharge.   Time spent on discharge in the examination, evaluation, counseling and review of medications and discharge plan: 34 minutes

## 2019-03-19 NOTE — CARE COORDINATION
CM reviewed chart for dc needs. Patient from home w/spouse. Independent PTA. Patient has PCP, Russell Quick and [employer] coverage. No identifiable CM needs at this time. CM available if needs arise.

## 2019-05-08 ENCOUNTER — APPOINTMENT (OUTPATIENT)
Dept: CT IMAGING | Age: 59
DRG: 092 | End: 2019-05-08
Payer: COMMERCIAL

## 2019-05-08 ENCOUNTER — APPOINTMENT (OUTPATIENT)
Dept: GENERAL RADIOLOGY | Age: 59
DRG: 092 | End: 2019-05-08
Payer: COMMERCIAL

## 2019-05-08 ENCOUNTER — APPOINTMENT (OUTPATIENT)
Dept: ULTRASOUND IMAGING | Age: 59
DRG: 092 | End: 2019-05-08
Payer: COMMERCIAL

## 2019-05-08 ENCOUNTER — HOSPITAL ENCOUNTER (INPATIENT)
Age: 59
LOS: 2 days | Discharge: HOME | DRG: 092 | End: 2019-05-13
Attending: EMERGENCY MEDICINE | Admitting: HOSPITALIST
Payer: COMMERCIAL

## 2019-05-08 DIAGNOSIS — R20.0 FACIAL NUMBNESS: ICD-10-CM

## 2019-05-08 DIAGNOSIS — M54.9 INTRACTABLE BACK PAIN: Primary | ICD-10-CM

## 2019-05-08 DIAGNOSIS — R42 DIZZINESS: ICD-10-CM

## 2019-05-08 DIAGNOSIS — E87.6 HYPOKALEMIA: ICD-10-CM

## 2019-05-08 DIAGNOSIS — R79.89 SERUM CREATININE RAISED: ICD-10-CM

## 2019-05-08 DIAGNOSIS — G44.201 ACUTE INTRACTABLE TENSION-TYPE HEADACHE: ICD-10-CM

## 2019-05-08 PROBLEM — E66.01 CLASS 3 SEVERE OBESITY DUE TO EXCESS CALORIES WITH BODY MASS INDEX (BMI) OF 40.0 TO 44.9 IN ADULT (HCC): Status: ACTIVE | Noted: 2019-05-08

## 2019-05-08 PROBLEM — N17.9 ACUTE KIDNEY INJURY (HCC): Status: ACTIVE | Noted: 2019-05-08

## 2019-05-08 PROBLEM — R29.90 STROKE-LIKE SYMPTOMS: Status: ACTIVE | Noted: 2019-05-08

## 2019-05-08 PROBLEM — E66.813 CLASS 3 SEVERE OBESITY DUE TO EXCESS CALORIES WITH BODY MASS INDEX (BMI) OF 40.0 TO 44.9 IN ADULT: Status: ACTIVE | Noted: 2019-05-08

## 2019-05-08 LAB
ALBUMIN SERPL-MCNC: 3.9 GM/DL (ref 3.4–5)
ALP BLD-CCNC: 92 IU/L (ref 40–129)
ALT SERPL-CCNC: 26 U/L (ref 10–40)
ANION GAP SERPL CALCULATED.3IONS-SCNC: 16 MMOL/L (ref 4–16)
APTT: 23.7 SECONDS (ref 21.2–33)
AST SERPL-CCNC: 18 IU/L (ref 15–37)
BACTERIA: NEGATIVE /HPF
BASOPHILS ABSOLUTE: 0.1 K/CU MM
BASOPHILS RELATIVE PERCENT: 0.4 % (ref 0–1)
BILIRUB SERPL-MCNC: 0.5 MG/DL (ref 0–1)
BILIRUBIN URINE: NEGATIVE MG/DL
BLOOD, URINE: NEGATIVE
BUN BLDV-MCNC: 40 MG/DL (ref 6–23)
CALCIUM SERPL-MCNC: 9.2 MG/DL (ref 8.3–10.6)
CHLORIDE BLD-SCNC: 97 MMOL/L (ref 99–110)
CHLORIDE URINE RANDOM: 63 MMOL/L (ref 43–210)
CHP ED QC CHECK: NORMAL
CLARITY: CLEAR
CO2: 24 MMOL/L (ref 21–32)
COLOR: ABNORMAL
CREAT SERPL-MCNC: 2.4 MG/DL (ref 0.6–1.1)
DIFFERENTIAL TYPE: ABNORMAL
EOSINOPHILS ABSOLUTE: 0.1 K/CU MM
EOSINOPHILS RELATIVE PERCENT: 0.7 % (ref 0–3)
GFR AFRICAN AMERICAN: 25 ML/MIN/1.73M2
GFR NON-AFRICAN AMERICAN: 21 ML/MIN/1.73M2
GLUCOSE BLD-MCNC: 132 MG/DL
GLUCOSE BLD-MCNC: 132 MG/DL (ref 70–99)
GLUCOSE BLD-MCNC: 138 MG/DL (ref 70–99)
GLUCOSE, URINE: 50 MG/DL
HCT VFR BLD CALC: 40.7 % (ref 37–47)
HEMOGLOBIN: 13.1 GM/DL (ref 12.5–16)
HYALINE CASTS: 2 /LPF
IMMATURE NEUTROPHIL %: 0.5 % (ref 0–0.43)
INR BLD: 0.98 INDEX
KETONES, URINE: NEGATIVE MG/DL
LEUKOCYTE ESTERASE, URINE: ABNORMAL
LYMPHOCYTES ABSOLUTE: 1.7 K/CU MM
LYMPHOCYTES RELATIVE PERCENT: 12.9 % (ref 24–44)
MAGNESIUM: 2.2 MG/DL (ref 1.8–2.4)
MCH RBC QN AUTO: 31.1 PG (ref 27–31)
MCHC RBC AUTO-ENTMCNC: 32.2 % (ref 32–36)
MCV RBC AUTO: 96.7 FL (ref 78–100)
MONOCYTES ABSOLUTE: 0.9 K/CU MM
MONOCYTES RELATIVE PERCENT: 7.2 % (ref 0–4)
MUCUS: ABNORMAL HPF
NITRITE URINE, QUANTITATIVE: NEGATIVE
NUCLEATED RBC %: 0 %
PDW BLD-RTO: 14.1 % (ref 11.7–14.9)
PH, URINE: 5 (ref 5–8)
PLATELET # BLD: 258 K/CU MM (ref 140–440)
PMV BLD AUTO: 10.2 FL (ref 7.5–11.1)
POTASSIUM SERPL-SCNC: ABNORMAL MMOL/L (ref 3.5–5.1)
POTASSIUM, UR: 25 MMOL/L (ref 22–119)
PRO-BNP: 343.2 PG/ML
PROTEIN UA: NEGATIVE MG/DL
PROTHROMBIN TIME: 11.2 SECONDS (ref 9.12–12.5)
RBC # BLD: 4.21 M/CU MM (ref 4.2–5.4)
RBC URINE: 1 /HPF (ref 0–6)
SEGMENTED NEUTROPHILS ABSOLUTE COUNT: 10.1 K/CU MM
SEGMENTED NEUTROPHILS RELATIVE PERCENT: 78.3 % (ref 36–66)
SODIUM BLD-SCNC: 137 MMOL/L (ref 135–145)
SODIUM URINE: 55 MMOL/L (ref 35–167)
SPECIFIC GRAVITY UA: 1.03 (ref 1–1.03)
SQUAMOUS EPITHELIAL: 3 /HPF
TOTAL CK: 101 IU/L (ref 26–140)
TOTAL IMMATURE NEUTOROPHIL: 0.07 K/CU MM
TOTAL NUCLEATED RBC: 0 K/CU MM
TOTAL PROTEIN: 7 GM/DL (ref 6.4–8.2)
TRICHOMONAS: ABNORMAL /HPF
TROPONIN T: <0.01 NG/ML
TROPONIN T: <0.01 NG/ML
TSH HIGH SENSITIVITY: 2.76 UIU/ML (ref 0.27–4.2)
UROBILINOGEN, URINE: NORMAL MG/DL (ref 0.2–1)
WBC # BLD: 12.9 K/CU MM (ref 4–10.5)
WBC UA: 3 /HPF (ref 0–5)

## 2019-05-08 PROCEDURE — 85610 PROTHROMBIN TIME: CPT

## 2019-05-08 PROCEDURE — 96375 TX/PRO/DX INJ NEW DRUG ADDON: CPT

## 2019-05-08 PROCEDURE — 6370000000 HC RX 637 (ALT 250 FOR IP): Performed by: NURSE PRACTITIONER

## 2019-05-08 PROCEDURE — 85730 THROMBOPLASTIN TIME PARTIAL: CPT

## 2019-05-08 PROCEDURE — 2580000003 HC RX 258: Performed by: EMERGENCY MEDICINE

## 2019-05-08 PROCEDURE — 6360000002 HC RX W HCPCS: Performed by: EMERGENCY MEDICINE

## 2019-05-08 PROCEDURE — 83880 ASSAY OF NATRIURETIC PEPTIDE: CPT

## 2019-05-08 PROCEDURE — 93005 ELECTROCARDIOGRAM TRACING: CPT | Performed by: EMERGENCY MEDICINE

## 2019-05-08 PROCEDURE — 85025 COMPLETE CBC W/AUTO DIFF WBC: CPT

## 2019-05-08 PROCEDURE — 82746 ASSAY OF FOLIC ACID SERUM: CPT

## 2019-05-08 PROCEDURE — G0378 HOSPITAL OBSERVATION PER HR: HCPCS

## 2019-05-08 PROCEDURE — 84133 ASSAY OF URINE POTASSIUM: CPT

## 2019-05-08 PROCEDURE — 96372 THER/PROPH/DIAG INJ SC/IM: CPT

## 2019-05-08 PROCEDURE — 76775 US EXAM ABDO BACK WALL LIM: CPT

## 2019-05-08 PROCEDURE — 80053 COMPREHEN METABOLIC PANEL: CPT

## 2019-05-08 PROCEDURE — 6360000004 HC RX CONTRAST MEDICATION: Performed by: EMERGENCY MEDICINE

## 2019-05-08 PROCEDURE — 84443 ASSAY THYROID STIM HORMONE: CPT

## 2019-05-08 PROCEDURE — 82962 GLUCOSE BLOOD TEST: CPT

## 2019-05-08 PROCEDURE — 70498 CT ANGIOGRAPHY NECK: CPT

## 2019-05-08 PROCEDURE — 84484 ASSAY OF TROPONIN QUANT: CPT

## 2019-05-08 PROCEDURE — 99285 EMERGENCY DEPT VISIT HI MDM: CPT

## 2019-05-08 PROCEDURE — 36415 COLL VENOUS BLD VENIPUNCTURE: CPT

## 2019-05-08 PROCEDURE — 82436 ASSAY OF URINE CHLORIDE: CPT

## 2019-05-08 PROCEDURE — 82607 VITAMIN B-12: CPT

## 2019-05-08 PROCEDURE — 96374 THER/PROPH/DIAG INJ IV PUSH: CPT

## 2019-05-08 PROCEDURE — 84300 ASSAY OF URINE SODIUM: CPT

## 2019-05-08 PROCEDURE — 96361 HYDRATE IV INFUSION ADD-ON: CPT

## 2019-05-08 PROCEDURE — 6360000002 HC RX W HCPCS: Performed by: NURSE PRACTITIONER

## 2019-05-08 PROCEDURE — 71045 X-RAY EXAM CHEST 1 VIEW: CPT

## 2019-05-08 PROCEDURE — 2580000003 HC RX 258: Performed by: NURSE PRACTITIONER

## 2019-05-08 PROCEDURE — 70450 CT HEAD/BRAIN W/O DYE: CPT

## 2019-05-08 PROCEDURE — 83735 ASSAY OF MAGNESIUM: CPT

## 2019-05-08 PROCEDURE — 81001 URINALYSIS AUTO W/SCOPE: CPT

## 2019-05-08 PROCEDURE — 82550 ASSAY OF CK (CPK): CPT

## 2019-05-08 PROCEDURE — 70496 CT ANGIOGRAPHY HEAD: CPT

## 2019-05-08 PROCEDURE — 6370000000 HC RX 637 (ALT 250 FOR IP): Performed by: EMERGENCY MEDICINE

## 2019-05-08 RX ORDER — SODIUM CHLORIDE 9 MG/ML
INJECTION, SOLUTION INTRAVENOUS CONTINUOUS
Status: DISCONTINUED | OUTPATIENT
Start: 2019-05-08 | End: 2019-05-08

## 2019-05-08 RX ORDER — VALSARTAN 320 MG/1
320 TABLET ORAL DAILY
COMMUNITY
Start: 2019-04-15

## 2019-05-08 RX ORDER — ONDANSETRON 2 MG/ML
4 INJECTION INTRAMUSCULAR; INTRAVENOUS EVERY 6 HOURS PRN
Status: DISCONTINUED | OUTPATIENT
Start: 2019-05-08 | End: 2019-05-14 | Stop reason: HOSPADM

## 2019-05-08 RX ORDER — POTASSIUM CHLORIDE 20 MEQ/1
40 TABLET, EXTENDED RELEASE ORAL ONCE
Status: COMPLETED | OUTPATIENT
Start: 2019-05-08 | End: 2019-05-08

## 2019-05-08 RX ORDER — DICYCLOMINE HCL 20 MG
20 TABLET ORAL 3 TIMES DAILY PRN
Status: DISCONTINUED | OUTPATIENT
Start: 2019-05-08 | End: 2019-05-14 | Stop reason: HOSPADM

## 2019-05-08 RX ORDER — 0.9 % SODIUM CHLORIDE 0.9 %
1000 INTRAVENOUS SOLUTION INTRAVENOUS ONCE
Status: COMPLETED | OUTPATIENT
Start: 2019-05-08 | End: 2019-05-08

## 2019-05-08 RX ORDER — DIPHENHYDRAMINE HYDROCHLORIDE 50 MG/ML
25 INJECTION INTRAMUSCULAR; INTRAVENOUS ONCE
Status: COMPLETED | OUTPATIENT
Start: 2019-05-08 | End: 2019-05-08

## 2019-05-08 RX ORDER — ASPIRIN 81 MG/1
81 TABLET ORAL DAILY
Status: DISCONTINUED | OUTPATIENT
Start: 2019-05-08 | End: 2019-05-14 | Stop reason: HOSPADM

## 2019-05-08 RX ORDER — SODIUM CHLORIDE 0.9 % (FLUSH) 0.9 %
10 SYRINGE (ML) INJECTION PRN
Status: DISCONTINUED | OUTPATIENT
Start: 2019-05-08 | End: 2019-05-14 | Stop reason: HOSPADM

## 2019-05-08 RX ORDER — FAMOTIDINE 20 MG/1
10 TABLET, FILM COATED ORAL 2 TIMES DAILY
Status: DISCONTINUED | OUTPATIENT
Start: 2019-05-08 | End: 2019-05-11

## 2019-05-08 RX ORDER — SODIUM CHLORIDE 0.9 % (FLUSH) 0.9 %
10 SYRINGE (ML) INJECTION EVERY 12 HOURS SCHEDULED
Status: DISCONTINUED | OUTPATIENT
Start: 2019-05-08 | End: 2019-05-14 | Stop reason: HOSPADM

## 2019-05-08 RX ORDER — POTASSIUM CHLORIDE 7.45 MG/ML
10 INJECTION INTRAVENOUS PRN
Status: DISCONTINUED | OUTPATIENT
Start: 2019-05-08 | End: 2019-05-14 | Stop reason: HOSPADM

## 2019-05-08 RX ORDER — POTASSIUM CHLORIDE 7.45 MG/ML
10 INJECTION INTRAVENOUS ONCE
Status: COMPLETED | OUTPATIENT
Start: 2019-05-08 | End: 2019-05-08

## 2019-05-08 RX ORDER — BACLOFEN 10 MG/1
10 TABLET ORAL 3 TIMES DAILY
Status: DISCONTINUED | OUTPATIENT
Start: 2019-05-08 | End: 2019-05-14 | Stop reason: HOSPADM

## 2019-05-08 RX ORDER — AMLODIPINE BESYLATE 5 MG/1
5 TABLET ORAL DAILY
COMMUNITY
Start: 2019-04-15

## 2019-05-08 RX ORDER — METOCLOPRAMIDE HYDROCHLORIDE 5 MG/ML
10 INJECTION INTRAMUSCULAR; INTRAVENOUS ONCE
Status: COMPLETED | OUTPATIENT
Start: 2019-05-08 | End: 2019-05-08

## 2019-05-08 RX ORDER — CHLORTHALIDONE 50 MG/1
50 TABLET ORAL DAILY
COMMUNITY
Start: 2019-04-17

## 2019-05-08 RX ORDER — BACLOFEN 10 MG/1
10 TABLET ORAL 3 TIMES DAILY
COMMUNITY
Start: 2019-05-03

## 2019-05-08 RX ORDER — LEVOTHYROXINE SODIUM 0.12 MG/1
125 TABLET ORAL DAILY
Status: DISCONTINUED | OUTPATIENT
Start: 2019-05-08 | End: 2019-05-14 | Stop reason: HOSPADM

## 2019-05-08 RX ORDER — SUCRALFATE 1 G/1
1 TABLET ORAL 2 TIMES DAILY
Status: DISCONTINUED | OUTPATIENT
Start: 2019-05-08 | End: 2019-05-14 | Stop reason: HOSPADM

## 2019-05-08 RX ORDER — ATORVASTATIN CALCIUM 40 MG/1
40 TABLET, FILM COATED ORAL NIGHTLY
Status: DISCONTINUED | OUTPATIENT
Start: 2019-05-08 | End: 2019-05-14 | Stop reason: HOSPADM

## 2019-05-08 RX ORDER — POTASSIUM CHLORIDE 20 MEQ/1
40 TABLET, EXTENDED RELEASE ORAL PRN
Status: DISCONTINUED | OUTPATIENT
Start: 2019-05-08 | End: 2019-05-14 | Stop reason: HOSPADM

## 2019-05-08 RX ORDER — POTASSIUM CHLORIDE AND SODIUM CHLORIDE 900; 300 MG/100ML; MG/100ML
INJECTION, SOLUTION INTRAVENOUS CONTINUOUS
Status: DISPENSED | OUTPATIENT
Start: 2019-05-08 | End: 2019-05-09

## 2019-05-08 RX ORDER — ETODOLAC 500 MG/1
500 TABLET, FILM COATED ORAL 2 TIMES DAILY
Status: ON HOLD | COMMUNITY
Start: 2019-04-22 | End: 2019-05-13 | Stop reason: HOSPADM

## 2019-05-08 RX ORDER — HEPARIN SODIUM 5000 [USP'U]/ML
5000 INJECTION, SOLUTION INTRAVENOUS; SUBCUTANEOUS EVERY 8 HOURS SCHEDULED
Status: DISCONTINUED | OUTPATIENT
Start: 2019-05-08 | End: 2019-05-14 | Stop reason: HOSPADM

## 2019-05-08 RX ORDER — POTASSIUM CHLORIDE 1.5 G/1.77G
40 POWDER, FOR SOLUTION ORAL PRN
Status: DISCONTINUED | OUTPATIENT
Start: 2019-05-08 | End: 2019-05-14 | Stop reason: HOSPADM

## 2019-05-08 RX ADMIN — SODIUM CHLORIDE 1000 ML: 9 INJECTION, SOLUTION INTRAVENOUS at 20:18

## 2019-05-08 RX ADMIN — POTASSIUM CHLORIDE 10 MEQ: 7.46 INJECTION, SOLUTION INTRAVENOUS at 18:58

## 2019-05-08 RX ADMIN — ASPIRIN 81 MG: 81 TABLET, COATED ORAL at 22:32

## 2019-05-08 RX ADMIN — DIPHENHYDRAMINE HYDROCHLORIDE 25 MG: 50 INJECTION, SOLUTION INTRAMUSCULAR; INTRAVENOUS at 18:41

## 2019-05-08 RX ADMIN — BACLOFEN 10 MG: 10 TABLET ORAL at 22:32

## 2019-05-08 RX ADMIN — HEPARIN SODIUM 5000 UNITS: 5000 INJECTION, SOLUTION INTRAVENOUS; SUBCUTANEOUS at 22:33

## 2019-05-08 RX ADMIN — ATORVASTATIN CALCIUM 40 MG: 40 TABLET, FILM COATED ORAL at 22:32

## 2019-05-08 RX ADMIN — SODIUM CHLORIDE, PRESERVATIVE FREE 10 ML: 5 INJECTION INTRAVENOUS at 22:33

## 2019-05-08 RX ADMIN — SODIUM CHLORIDE 1000 ML: 9 INJECTION, SOLUTION INTRAVENOUS at 18:34

## 2019-05-08 RX ADMIN — IOPAMIDOL 100 ML: 755 INJECTION, SOLUTION INTRAVENOUS at 18:14

## 2019-05-08 RX ADMIN — POTASSIUM CHLORIDE 40 MEQ: 1500 TABLET, EXTENDED RELEASE ORAL at 18:52

## 2019-05-08 RX ADMIN — SUCRALFATE 1 G: 1 TABLET ORAL at 22:32

## 2019-05-08 RX ADMIN — METOCLOPRAMIDE 10 MG: 5 INJECTION, SOLUTION INTRAMUSCULAR; INTRAVENOUS at 18:37

## 2019-05-08 RX ADMIN — FAMOTIDINE 10 MG: 20 TABLET ORAL at 22:32

## 2019-05-08 ASSESSMENT — ENCOUNTER SYMPTOMS
ALLERGIC/IMMUNOLOGIC NEGATIVE: 1
RESPIRATORY NEGATIVE: 1
DIARRHEA: 1
PHOTOPHOBIA: 1

## 2019-05-08 ASSESSMENT — PAIN SCALES - GENERAL: PAINLEVEL_OUTOF10: 3

## 2019-05-08 ASSESSMENT — PAIN DESCRIPTION - RADICULAR PAIN: RADICULAR_PAIN: ABSENT

## 2019-05-08 NOTE — ED PROVIDER NOTES
[facility]      TRIAGE CHIEF COMPLAINT:   Dizziness      [place]:  Brianda Christensen is a 64 y.o. female that presents complaining of dizziness, headache, photophobia, lightheadedness, paresthesias. Symptoms started about half ago she felt a headache behind her left eye usually her headaches are behind her right eye denies cluster headaches has photophobia, blurred vision denies any unilateral weakness complaints of some slurred speech and left-sided facial numbness no history of stroke no history of aneurysm has had diarrhea. Generally weak denies any chest pain cough shortness of breath no other questions or concerns stroke alert called on arrival    REVIEW OF SYSTEMS:  At least 10 systems reviewed and otherwise acutely negative except as in the [address] Review of Systems   Constitutional: Positive for fatigue. HENT: Negative. Eyes: Positive for photophobia and visual disturbance. Respiratory: Negative. Cardiovascular: Negative. Gastrointestinal: Positive for diarrhea. Endocrine: Negative. Genitourinary: Negative. Musculoskeletal: Negative. Skin: Negative. Allergic/Immunologic: Negative. Neurological: Positive for dizziness, speech difficulty, weakness, numbness and headaches. Hematological: Negative. Psychiatric/Behavioral: Negative. All other systems reviewed and are negative. Past Medical History:   Diagnosis Date    Anxiety     \"work related\"    Arthritis     Chest pain     Fatigue     Fatigue     \"increased fatigue, random fever, headaches- doing lumbar puncture to check this\"(scheduled 9/11/2013)    Generalized anxiety disorder     GERD (gastroesophageal reflux disease)     Occasionally    H/O cardiovascular stress test 1/13/2014    cardiolite-no ischemia, EF 70%,normal    H/O CT scan of chest 12/23/13    Right upper lobe pulmonary nodule continues to shrink in size.  Mediastinal and hilar adenopathy  are present but also significant improved.  Histoplasmosis     History of cardiac monitoring 1/13/14    Event - NSR    History of echocardiogram 1/13/2014    EF 55% mild TR    History of histoplasmosis     History of hysterectomy     History of kidney stones     \"8 yrs ago- passed them on my own\"    History of lymph node biopsy     Hypertension     Hypothyroidism     Kidney stones     Migraines     \"have had a constant migraine for the past 6 weeks, gets worse at times but never goes away\"    Mild tricuspid regurgitation 1/13/2014    Peptic ulcer     Peptic ulcer     As a teenager.  Shortness of breath     SOB (shortness of breath)     Syncope and collapse     Thyroid disease      Past Surgical History:   Procedure Laterality Date    APPENDECTOMY      APPENDECTOMY  1979(pc)    CHOLECYSTECTOMY      CHOLECYSTECTOMY  2008(pc)    Lap Choley    COLONOSCOPY  2008    DILATION AND CURETTAGE OF UTERUS  2008    ENDOSCOPY, COLON, DIAGNOSTIC  2008    HYSTERECTOMY  2008    per old chart pt had exp.  laparotomy with lysis of adhesions and JACK/BSO done 2008(pc)    KNEE SURGERY Left 11/2012    \"ACL surgery with a scope\"    MEDIASTINOSCOPY  11/05/2013    OTHER SURGICAL HISTORY  11 05 2013    medianstinoscopy    JACK AND BSO      UPPER GASTROINTESTINAL ENDOSCOPY N/A 3/19/2019    EGD BIOPSY performed [facility] at [facility] ENDOSCOPY     Family History   Problem Relation Age of Onset    Other Mother         lung neoplasm, malignant    Cancer Mother         lung ca    Heart Disease Mother     Other Father         lymphoma    Cancer Father         lymphoma    High Blood Pressure Sister     Heart Disease Sister     Stroke Sister         tia    Diabetes Sister     Heart Disease Maternal Grandfather      Social History     Socioeconomic History    Marital status:      Spouse name: Not on file    Number of children: Not on file    Years of education: Not on file    Highest education level: Not on file   Occupational History    Not on file   Social Needs    Financial resource strain: Not on file    Food insecurity:     Worry: Not on file     Inability: Not on file    Transportation needs:     Medical: Not on file     Non-medical: Not on file   Tobacco Use    Smoking status: Never Smoker    Smokeless tobacco: Never Used    Tobacco comment: exposed to heavy 2nd hand smoke x 18 yrs per parents   Substance and Sexual Activity    Alcohol use: Yes     Comment: glass of wine one time per month at the most     CAFFEINE: 1 cup coffee or 1 soda daily.  Drug use: No    Sexual activity: Not on file     Comment:    Lifestyle    Physical activity:     Days per week: Not on file     Minutes per session: Not on file    Stress: Not on file   Relationships    Social connections:     Talks on phone: Not on file     Gets together: Not on file     Attends Druze service: Not on file     Active member of club or organization: Not on file     Attends meetings of clubs or organizations: Not on file     Relationship status: Not on file    Intimate partner violence:     Fear of current or ex partner: Not on file     Emotionally abused: Not on file     Physically abused: Not on file     Forced sexual activity: Not on file   Other Topics Concern    Not on file   Social History Narrative    ** Merged History Encounter **          No current facility-administered medications for this encounter. Current Outpatient Medications   Medication Sig Dispense Refill    pantoprazole (PROTONIX) 40 MG tablet Take 1 tablet by mouth 2 times daily (before meals) 180 tablet 1    dicyclomine (BENTYL) 20 MG tablet Take 1 tablet by mouth 3 times daily as needed (stomach spasms) 120 tablet 3    levothyroxine (SYNTHROID) 125 MCG tablet Take 1 tablet by mouth daily 90 tablet 1    methotrexate (RHEUMATREX) 2.5 MG chemo tablet Take 12.5 mg by mouth once a week      gabapentin (NEURONTIN) 300 MG capsule Take 300 mg by mouth 2 times daily.       Etodolac (LODINE PO) Take 500 mg by mouth 2 times daily      predniSONE (DELTASONE) 10 MG tablet Take 10 mg by mouth daily Indications: unsure of dose         Allergies   Allergen Reactions    Other Anaphylaxis     allergic to cilantro \"trouble breathing\"    Pcn [Penicillins] Nausea Only    Pcn [Penicillins] Nausea And Vomiting     No current facility-administered medications for this encounter. Current Outpatient Medications   Medication Sig Dispense Refill    pantoprazole (PROTONIX) 40 MG tablet Take 1 tablet by mouth 2 times daily (before meals) 180 tablet 1    dicyclomine (BENTYL) 20 MG tablet Take 1 tablet by mouth 3 times daily as needed (stomach spasms) 120 tablet 3    levothyroxine (SYNTHROID) 125 MCG tablet Take 1 tablet by mouth daily 90 tablet 1    methotrexate (RHEUMATREX) 2.5 MG chemo tablet Take 12.5 mg by mouth once a week      gabapentin (NEURONTIN) 300 MG capsule Take 300 mg by mouth 2 times daily.  Etodolac (LODINE PO) Take 500 mg by mouth 2 times daily      predniSONE (DELTASONE) 10 MG tablet Take 10 mg by mouth daily Indications: unsure of dose          Nursing Notes Reviewed    VITAL SIGNS:  ED Triage Vitals   Enc Vitals Group      BP       Pulse       Resp       Temp       Temp src       SpO2       Weight       Height       Head Circumference       Peak Flow       Pain Score       Pain Loc       Pain Edu? Excl. in [address]? PHYSICAL EXAM:  Physical Exam   Constitutional: She is oriented to person, place, and time. She appears well-developed and well-nourished. She is active and cooperative. Non-toxic appearance. She does not have a sickly appearance. She does not appear ill. No distress. HENT:   Head: Normocephalic and atraumatic. Right Ear: External ear normal.   Left Ear: External ear normal.   Mouth/Throat: Oropharynx is clear and moist.   Eyes: Pupils are equal, round, and reactive to light. Conjunctivae and EOM are normal. Right eye exhibits no discharge. Left eye exhibits no discharge. Neck: Normal range of motion, full passive range of motion without pain and phonation normal. No JVD present. Carotid bruit is not present. No neck rigidity. No edema, no erythema and normal range of motion present. Cardiovascular: Normal rate, regular rhythm, normal heart sounds and intact distal pulses. Exam reveals no gallop and no friction rub. No murmur heard. Pulmonary/Chest: Effort normal and breath sounds normal. No stridor. No respiratory distress. She has no wheezes. She has no rales. Abdominal: Soft. Bowel sounds are normal. She exhibits no distension and no mass. There is no tenderness. There is no rigidity, no rebound, no tenderness at McBurney's point and negative Garcia's sign. Musculoskeletal: Normal range of motion. She exhibits no edema, tenderness or deformity. Neurological: She is alert and oriented to person, place, and time. She has normal strength. She is not disoriented. She displays no atrophy and no tremor. A sensory deficit is present. No cranial nerve deficit. She exhibits normal muscle tone. She displays no seizure activity. Coordination normal. GCS eye subscore is 4. GCS verbal subscore is 5. GCS motor subscore is 6. Normal finger to nose, decreased sensation to L side of face. Skin: Skin is warm. No rash noted. She is not diaphoretic. No erythema. No pallor. Psychiatric: She has a normal mood and affect. Her behavior is normal. Judgment and thought content normal.   Nursing note and vitals reviewed. I have reviewed andinterpreted all of the currently available lab results from this visit (if applicable):    No results found for this visit on 05/08/19.      Radiographs (if obtained):  [] The following radiograph was interpreted by myself in the absence of a radiologist:  [x] Radiologist's Report Reviewed:    CTA Head/neck, CXR    EKG (if obtained): (All EKG's are interpreted by myself in the absence of a cardiologist)    12 lead EKG per my interpretation:  Normal Sinus Rhythm 87  Axis is   Normal  QTc is  471  There is no specific T wave changes appreciated. There is no specific ST wave changes appreciated. Prior EKG to compare with was not available       Total critical care time today provided was at least 30 minutes. This excludes seperately billable procedure. Critical care time provided for reviewing labs, images consulting medicine consulting neurology giving fluids, potassium that required close evaluation and/or intervention with concern for patient decompensation. MDM:    Patient here with dizziness headache, slurred speech lightheadedness. Again symptoms started an hour and a half ago. Has a headache behind her left eye I do not see any signs of infection she usually has headaches behind of her right eye no history of cluster headache she complains of left-sided paresthesias to her face I do not appreciate any unilateral weakness she has no prior bruit no slurred speech and my exam normal finger to nose no weakness stroke alert was called workup performed including CTA's head/neck, cxr. Patient found to have low potassium and elevated serum creatinine potassium [facility] contacted for stroke workup no TPA candidate given mild symptoms and possible complex migraine causing this. Patient admitted for observation CTAs of head and neck are negative labs otherwise negative. Patient stable. Given a migraine cocktail.,    CLINICAL IMPRESSION:  Final diagnoses:   Dizziness   Facial numbness       (Please note that portions of this note may have been completed with a voice recognition program. Efforts were made to edit the dictations but occasionally words aremis-transcribed.)    DISPOSITION REFERRAL (if applicable):  No follow-up provider specified.     DISPOSITION MEDICATIONS (if applicable):  New Prescriptions    No medications on file          Lawrence Edward, [facility],   05/08/19 [id number]

## 2019-05-08 NOTE — H&P
History and Physical  [facility]   [facility] Hospitalist        Name:  Clemencia Powell /Age/Sex: 1960  (64 y.o. female)   MRN & CSN:  [id number] & [id number] Admission Date/Time: 2019  5:26 PM   Location:  ED30/ED-30 PCP: Geri Hill MD       Hospital Day: 1      Supervising Physician: Dr. Juancho Lopes    Chief Complaint: Dizziness, Left facial numbness, headache, photophobia, lightheadedness, slurred speech. Assessment and Plan:   Clemencia Powell is a 64 y.o. morbidly obese female who presents with Stroke-like symptoms     Stroke-like symptoms: Left facial numbness, r/o CVA - other c/o headache, photophobia, lightheadedness, slurred speech-which is resolved prior to ED arrival except for facial numbness, all images were-negative for acute abnormality; NIH score/assessment- negative; initial trop negative. - admit for obs, telemetry monitoring          - trend trop         - Neuro checks         - start ASA, statin, hold lisinopril, losartan for now d/t borderline BP         - PT/OT eval and treat         - swallow eval         - Stroke education         - pending MRI Brain w/o contrast         - check lab works in AM     Acute kidney injury - could be pre-renal, volume depletion, Crea 2.4 (BL 1.0), BUN 40 (BL 17), GFR 21.         - consult Nephrology         - continue IVF         - monitor I/O         - avoid nephrotoxic agents, adjust meds for GFR if needed         - monitor Crea level, renal panel in AM         - pending Renal US     Acute hypokalemia - serum K 2.8, Mg 2.2         - cont telemetry monitoring         - cont IVF with KCl         - start K sliding scale per protocol     Hypertension - borderline BP in ED, SBP b/w 90's-100's, hold all BP meds for now, close BP monitoring.  Class 3 severe obesity d/t  Excess calories w/ BMI of 40.0 to 44.9 in adult - current BMI 40.8, consult dietician for weight control and plan based diet.      Chronic Illnesses: will continue current home medications unless contraindicated by above plan and assessment.          - GERD/Peptic ulcer - on PPI, carafate         - Hypothyroidism - cont daily synthroid. - migraines         - anxiety     Current diagnosis and plan of management discussed with the patient at the time of admission in lay language who agree(s) to the above plan and disposition of admission for further care. All concerns and questions addressed. Patient assessment and plan in conjunction with supervising physician - Dr. Shantal Haywood Effective Now for bedside swallow eval   DVT Prophylaxis [] Lovenox, [x]  Heparin, [x] SCDs, [] Ambulation  [] Long term AC   GI Prophylaxis [x] PPI,  [] H2 Blocker,  [x] Carafate,  [] Diet,  [] No GI prophylaxis, N/A: patient is not under significant medical stress, non-ICU or is receiving a diet/tube feeds   Code Status Full CODE status, discussed with patient and family at bedside upon admission. Disposition Patient requires continued admission due to Stroke-like symptoms: Left facial numbness, r/o CVA, Acute kidney injury, Acute hypokalemia. Discharge Plan: Patient plans to return home upon discharge. MDM [] Low, [x] Moderate,[]  High  Patient's risk as above due to:      [x] One or more chronic illnesses with mild exacerbation progression      [] Two or more stable chronic illnesses      [] Undiagnosed new problem with uncertain prognosis      [] Elective major surgery      []Prescription drug management     History of Present Illness:     Principal Problem: Left facial numbness  Segundo Precsott is a 64 y.o. morbidly obese female who presents to the ED with complaints of dizziness, headache, photophobia, lightheadedness, paresthesias. Patient has PMH of GERD, peptic ulcer, hypothyroidism, hypertension, migraines, and anxiety. Patient's last admission here was 3/17 to 3/19 for syncope and collapse.  Patient stated that the dizziness that she experienced was similar to her last admission. However, the reason for her coming today are the left facial numbness, headache, photophobia, lightheadedness, blurred vision, and some slurred speech. She denied history of stroke and diabetes. Patient went to the bathroom and back to her ED bed without difficulty and denied feeling dizzy and lightheaded when moving and standing up. Pt seen and examined. Patient denies CP, palpitation, fever, chills or diaphoresis. Denies cough, SOB or difficulty breathing. Denies any other symptoms including abdominal pain, N/V/D, constipation, changes in bowels, dysuria, hematuria, frequency or urgency, and B/L weakness. Upon interview, the patient provided the history as above. ED Course: Discussed case with ED physician prior to admission. ROS: As per HPI, otherwise 10-systems reviewed negative. Objective:   No intake or output data in the 24 hours ending 05/08/19 1912     Vitals:   Vitals:    05/08/19 1804 05/08/19 1805 05/08/19 1816 05/08/19 1830   BP: 122/78   (!) 121/58   Pulse: 89   85   Resp: 18   12   Temp: 98 °F (36.7 °C)      TempSrc: Oral      SpO2: 100%   100%   Weight:   260 lb (117.9 kg)    Height:  5' 7\" (1.702 m) 5' 7\" (1.702 m)      Physical Exam: 05/08/19    GEN  -Awake, alert, appearing morbidly obese female, sitting upright in bed, cooperative, able to give adequate history. NAD. No body habitus. Appears given age. EYES -PERRL. No vision changes. No scleral erythema, discharge, or conjunctivitis. HENT -MM are moist. Oral pharynx without exudates, no evidence of thrush. NECK -Supple, no apparent thyromegaly or masses. RESP -LS CTA equal bilat, no wheezes, rales or rhonchi. Symmetric chest movement. No respiratory distress noted. C/V  -S1/S2 auscultated. RRR without appreciable M/R/G. No JVD or carotid bruits. Peripheral pulses equal bilaterally and palpable. No peripheral edema. No reproducible chest wall tenderness.    GI  -Abdomen is  Smoking status: Never Smoker    Smokeless tobacco: Never Used    Tobacco comment: exposed to heavy 2nd hand smoke x 18 yrs per parents   Substance and Sexual Activity    Alcohol use: Yes     Comment: glass of wine one time per month at the most     CAFFEINE: 1 cup coffee or 1 soda daily.  Drug use: No    Sexual activity: None     Comment:    Lifestyle    Physical activity:     Days per week: None     Minutes per session: None    Stress: None   Relationships    Social connections:     Talks on phone: None     Gets together: None     Attends Jewish service: None     Active member of club or organization: None     Attends meetings of clubs or organizations: None     Relationship status: None    Intimate partner violence:     Fear of current or ex partner: None     Emotionally abused: None     Physically abused: None     Forced sexual activity: None   Other Topics Concern    None   Social History Narrative    ** Merged History Encounter **          TOBACCO:   reports that she has never smoked. She has never used smokeless tobacco.  ETOH:   reports that she drinks alcohol. Drugs:  reports that she does not use drugs. Allergies: Allergies   Allergen Reactions    Other Anaphylaxis     allergic to cilantro \"trouble breathing\"    Pcn [Penicillins] Nausea Only    Pcn [Penicillins] Nausea And Vomiting     Medications:   Medications:    sodium chloride  1,000 mL Intravenous Once    baclofen  10 mg Oral TID    levothyroxine  125 mcg Oral Daily    sodium chloride flush  10 mL Intravenous 2 times per day    aspirin  81 mg Oral Daily    sucralfate  1 g Oral BID    famotidine  10 mg Oral BID    atorvastatin  40 mg Oral Nightly    heparin (porcine)  5,000 Units Subcutaneous 3 times per day      Infusions:   PRN Meds:    Prior to Admission Meds:  Prior to Admission medications    Medication Sig Start Date End Date Taking?  Authorizing Provider   amLODIPine (NORVASC) 5 MG tablet Take 5 mg by mouth daily 4/15/19   Historical Provider, MD   chlorthalidone (HYGROTEN) 50 MG tablet Take 50 mg by mouth daily 4/17/19   Historical Provider, MD   valsartan (DIOVAN) 320 MG tablet Take 320 mg by mouth daily 4/15/19   Historical Provider, MD   etodolac (LODINE) 500 MG tablet Take 500 mg by mouth 2 times daily 4/22/19   Historical Provider, MD   baclofen (LIORESAL) 10 MG tablet Take 10 mg by mouth 3 times daily 5/3/19   Historical Provider, MD   dicyclomine (BENTYL) 20 MG tablet Take 1 tablet by mouth 3 times daily as needed (stomach spasms) 3/19/19   Veronica Acuna MD   levothyroxine (SYNTHROID) 125 MCG tablet Take 1 tablet by mouth daily 3/19/19   Veronica Acuna MD   methotrexate (RHEUMATREX) 2.5 MG chemo tablet Take 12.5 mg by mouth once a week    Historical Provider, MD   predniSONE (DELTASONE) 10 MG tablet Take 10 mg by mouth daily Indications: unsure of dose     Historical Provider, MD     Data:     Laboratory this visit:  Reviewed  Recent Labs     05/08/19  1738   WBC 12.9*   HGB 13.1   HCT 40.7         Recent Labs     05/08/19  1738      K 2.8  K CALLED TO DR CRUM [facility] AT 1821 ON [id number] BY [facility]   RESULTS READ BACK  *   CL 97*   CO2 24   BUN 40*   CREATININE 2.4*     Recent Labs     05/08/19  1738   AST 18   ALT 26   BILITOT 0.5   ALKPHOS 92     Recent Labs     05/08/19  1738   INR 0.98     Recent Labs     05/08/19  1738   CKTOTAL 101     Invalid input(s): PRO-BNP    Radiology this visit:  Reviewed. Ct Head Wo Contrast    Result Date: 5/8/2019  EXAMINATION: CT OF THE HEAD WITHOUT CONTRAST  5/8/2019 5:44 pm TECHNIQUE: CT of the head was performed without the administration of intravenous contrast. Dose modulation, iterative reconstruction, and/or weight based adjustment of the mA/kV was utilized to reduce the radiation dose to as low as reasonably achievable.  COMPARISON: 01/16/2014 HISTORY: ORDERING SYSTEM PROVIDED HISTORY: HEAD TRAUMA, CLOSED, MILD, GCS >= 13, NO RISK FACTORS, NEURO EXAM NORMAL TECHNOLOGIST PROVIDED HISTORY: Has a \"code stroke\" or \"stroke alert\" been called? ->Yes Ordering Physician Provided Reason for Exam: stroke alert  dizzy Acuity: Acute Type of Exam: Initial Additional signs and symptoms: stroke Relevant Medical/Surgical History: hx htn FINDINGS: BRAIN/VENTRICLES: There is no acute intracranial hemorrhage, mass effect or midline shift. No abnormal extra-axial fluid collection. The gray-white differentiation is maintained without evidence of an acute infarct. There is no evidence of hydrocephalus. ORBITS: The visualized portion of the orbits demonstrate no acute abnormality. SINUSES: The visualized paranasal sinuses and mastoid air cells demonstrate no acute abnormality. SOFT TISSUES/SKULL:  No acute abnormality of the visualized skull or soft tissues. No acute intracranial abnormality. Findings were discussed with [facility] at 5:56 pm on 5/8/2019. Xr Chest Portable    Result Date: 5/8/2019  EXAMINATION: ONE XRAY VIEW OF THE CHEST 5/8/2019 5:47 pm COMPARISON: Chest x-ray February 18, 2019 HISTORY: ORDERING SYSTEM PROVIDED HISTORY: stroke? TECHNOLOGIST PROVIDED HISTORY: Reason for exam:->stroke? Ordering Physician Provided Reason for Exam: lt. side weakness Acuity: Acute Type of Exam: Initial Additional signs and symptoms: na Relevant Medical/Surgical History: hypertension FINDINGS: Cardiac silhouette is stable. No focal consolidation, pleural effusion, or pneumothorax identified. No acute osseous abnormality evident. 1. No acute cardiopulmonary process identified. Cta Neck W Contrast    Result Date: 5/8/2019  EXAMINATION: CTA OF THE NECK; CTA OF THE HEAD WITH CONTRAST 5/8/2019 6:12 pm: TECHNIQUE: CTA of the neck was performed with the administration of intravenous contrast. Multiplanar reformatted images are provided for review. MIP images are provided for review. Stenosis of the internal carotid arteries measured using NASCET criteria.  Dose course and caliber without focal stenosis. The anterior cerebral and middle cerebral arteries demonstrate no focal stenosis. POSTERIOR CIRCULATION: The posterior cerebral arteries demonstrate no focal stenosis. The vertebral and basilar arteries appear unremarkable. Bilateral posterior communicating arteries are present. BRAIN: No aneurysm or vascular malformation. 1. Motion limited evaluation of the cervical internal carotid arteries. 2. No acute arterial abnormality or hemodynamically significant arterial stenosis in the head or neck. Cta Head W Contrast    Result Date: 5/8/2019  EXAMINATION: CTA OF THE NECK; CTA OF THE HEAD WITH CONTRAST 5/8/2019 6:12 pm: TECHNIQUE: CTA of the neck was performed with the administration of intravenous contrast. Multiplanar reformatted images are provided for review. MIP images are provided for review. Stenosis of the internal carotid arteries measured using NASCET criteria. Dose modulation, iterative reconstruction, and/or weight based adjustment of the mA/kV was utilized to reduce the radiation dose to as low as reasonably achievable.; CTA of the head/brain was performed with the administration of intravenous contrast. Multiplanar reformatted images are provided for review. MIP images are provided for review. Dose modulation, iterative reconstruction, and/or weight based adjustment of the mA/kV was utilized to reduce the radiation dose to as low as reasonably achievable. COMPARISON: None. HISTORY: ORDERING SYSTEM PROVIDED HISTORY: dizzy/facial numbness TECHNOLOGIST PROVIDED HISTORY: Has a \"code stroke\" or \"stroke alert\" been called?-> yes Ordering Physician Provided Reason for Exam: dizzy/facial numbness,,, stroke alert Acuity: Acute Additional signs and symptoms: 100cc isovue 370 Relevant Medical/Surgical History: hx htn; FINDINGS: CTA NECK: AORTIC ARCH/ARCH VESSELS: There is a normal branch pattern of the aortic arch.  No significant stenosis is seen of the innominate artery or subclavian arteries. CAROTID ARTERIES: The common carotid arteries are normal in caliber. Motion artifact limits evaluation of the internal and external carotid arteries. No high-grade stenosis or occlusion is seen involving the cervical internal carotid or external carotid arteries. VERTEBRAL ARTERIES: The vertebral arteries both arise from the subclavian arteries and are normal in caliber without evidence of flow limiting stenosis or dissection. SOFT TISSUES: The lung apices are clear. No cervical or superior mediastinal lymphadenopathy. The visualized portion of the larynx and pharynx appear unremarkable. The parotid, submandibular and thyroid glands demonstrate no acute abnormality. BONES: There is no acute fracture or suspect osseous lesion. Mild-to-moderate degenerative changes in the cervical spine greatest at C5-6 and C6-7. CTA HEAD: ANTERIOR CIRCULATION: The internal carotid arteries are normal in course and caliber without focal stenosis. The anterior cerebral and middle cerebral arteries demonstrate no focal stenosis. POSTERIOR CIRCULATION: The posterior cerebral arteries demonstrate no focal stenosis. The vertebral and basilar arteries appear unremarkable. Bilateral posterior communicating arteries are present. BRAIN: No aneurysm or vascular malformation. 1. Motion limited evaluation of the cervical internal carotid arteries. 2. No acute arterial abnormality or hemodynamically significant arterial stenosis in the head or neck. EKG this visit:   EKG: normal sinus rhythm, rate 87. No significant change was found.     Current Treatment Team:  Treatment Team: Attending Provider: Adrianne Nava MD; Consulting Physician: Adrianne Nava MD; LPN: Charmaine García LPN; Consulting Physician: DO Kyle Hoyt APRN-BC [facility]  5/8/2019 7:12 PM      Electronically signed by KARTIK Valdez CNP on 5/8/2019 at 7:12 PM

## 2019-05-08 NOTE — ED NOTES
Report given to [facility] and transfer of care.      Moustapha Velazquez RN  05/08/19 6413

## 2019-05-08 NOTE — DISCHARGE INSTR - COC
ECZZ:[id number]}    Rehab Therapies: {THERAPEUTIC INTERVENTION:5391019315}  Weight Bearing Status/Restrictions: {[facility] Weight Bearin}  Other Medical Equipment (for information only, NOT a DME order):  {EQUIPMENT:719990918}  Other Treatments: ***    Patient's personal belongings (please select all that are sent with patient):  {CHP DME Belongings:612836650}    RN SIGNATURE:  {Esignature:[id number]}    CASE MANAGEMENT/SOCIAL WORK SECTION    Inpatient Status Date: ***    Readmission Risk Assessment Score:  Readmission Risk              Risk of Unplanned Readmission:        0           Discharging to Facility/ Agency   · Name:   · Address:  · Phone:  · Fax:    Dialysis Facility (if applicable)   · Name:  · Address:  · Dialysis Schedule:  · Phone:  · Fax:    / signature: {Esignature:[id number]}    PHYSICIAN SECTION    Prognosis: {Prognosis:8715731898}    Condition at Discharge: [address] [place] [address] Patient Condition:601175914}    Rehab Potential (if transferring to Rehab): {Prognosis:7991910067}    Recommended Labs or Other Treatments After Discharge: ***    Physician Certification: I certify the above information and transfer of Virginie Obrien  is necessary for the continuing treatment of the diagnosis listed and that she requires {Admit to Appropriate Level of Care:76307} for {GREATER/LESS:060108422} 30 days.      Update Admission H&P: {CHP DME Changes in [facility]:331715438}    PHYSICIAN SIGNATURE:  {Esignature:[id number]}

## 2019-05-08 NOTE — ED NOTES
Assuming patient care. Patient returning from CT scan from [facility]. Linda Baird.  Mehrdad, ELSIE  05/08/19 9097

## 2019-05-08 NOTE — CODE DOCUMENTATION
Patient reports dizziness began this morning around 9 am. Patient reports slurred and slow speech, left sided facial numbness began around 1400 today. Patient reports falling more frequently for the past few months. Medics brought patient in with reports of patient's head feeling like pins and needles, light aura, syncope feeling. Medics report Blood glucose 167. Medics report negative stroke assessment.

## 2019-05-09 ENCOUNTER — APPOINTMENT (OUTPATIENT)
Dept: MRI IMAGING | Age: 59
DRG: 092 | End: 2019-05-09
Payer: COMMERCIAL

## 2019-05-09 LAB
ALBUMIN SERPL-MCNC: 3.4 GM/DL (ref 3.4–5)
ANION GAP SERPL CALCULATED.3IONS-SCNC: 11 MMOL/L (ref 4–16)
BASOPHILS ABSOLUTE: 0 K/CU MM
BASOPHILS RELATIVE PERCENT: 0.4 % (ref 0–1)
BUN BLDV-MCNC: 35 MG/DL (ref 6–23)
CALCIUM SERPL-MCNC: 8.4 MG/DL (ref 8.3–10.6)
CHLORIDE BLD-SCNC: 103 MMOL/L (ref 99–110)
CHOLESTEROL: 163 MG/DL
CO2: 27 MMOL/L (ref 21–32)
CREAT SERPL-MCNC: 1.9 MG/DL (ref 0.6–1.1)
DIFFERENTIAL TYPE: ABNORMAL
EOSINOPHILS ABSOLUTE: 0.2 K/CU MM
EOSINOPHILS RELATIVE PERCENT: 2 % (ref 0–3)
ESTIMATED AVERAGE GLUCOSE: 111 MG/DL
FOLATE: 7.4 NG/ML (ref 3.1–17.5)
GFR AFRICAN AMERICAN: 33 ML/MIN/1.73M2
GFR NON-AFRICAN AMERICAN: 27 ML/MIN/1.73M2
GLUCOSE BLD-MCNC: 88 MG/DL (ref 70–99)
HBA1C MFR BLD: 5.5 % (ref 4.2–6.3)
HCT VFR BLD CALC: 35.9 % (ref 37–47)
HDLC SERPL-MCNC: 36 MG/DL
HEMOGLOBIN: 11.3 GM/DL (ref 12.5–16)
IMMATURE NEUTROPHIL %: 0.4 % (ref 0–0.43)
LDL CHOLESTEROL DIRECT: 117 MG/DL
LYMPHOCYTES ABSOLUTE: 2.6 K/CU MM
LYMPHOCYTES RELATIVE PERCENT: 28.3 % (ref 24–44)
MCH RBC QN AUTO: 31.1 PG (ref 27–31)
MCHC RBC AUTO-ENTMCNC: 31.5 % (ref 32–36)
MCV RBC AUTO: 98.9 FL (ref 78–100)
MONOCYTES ABSOLUTE: 0.5 K/CU MM
MONOCYTES RELATIVE PERCENT: 4.8 % (ref 0–4)
NUCLEATED RBC %: 0 %
PDW BLD-RTO: 14.2 % (ref 11.7–14.9)
PHOSPHORUS: 2.8 MG/DL (ref 2.5–4.9)
PLATELET # BLD: 185 K/CU MM (ref 140–440)
PMV BLD AUTO: 10.4 FL (ref 7.5–11.1)
POTASSIUM SERPL-SCNC: 3.5 MMOL/L (ref 3.5–5.1)
RBC # BLD: 3.63 M/CU MM (ref 4.2–5.4)
SEGMENTED NEUTROPHILS ABSOLUTE COUNT: 6 K/CU MM
SEGMENTED NEUTROPHILS RELATIVE PERCENT: 64.1 % (ref 36–66)
SODIUM BLD-SCNC: 141 MMOL/L (ref 135–145)
TOTAL IMMATURE NEUTOROPHIL: 0.04 K/CU MM
TOTAL NUCLEATED RBC: 0 K/CU MM
TRIGL SERPL-MCNC: 191 MG/DL
TROPONIN T: <0.01 NG/ML
VITAMIN B-12: 716.3 PG/ML (ref 211–911)
WBC # BLD: 9.3 K/CU MM (ref 4–10.5)

## 2019-05-09 PROCEDURE — G0378 HOSPITAL OBSERVATION PER HR: HCPCS

## 2019-05-09 PROCEDURE — 84484 ASSAY OF TROPONIN QUANT: CPT

## 2019-05-09 PROCEDURE — 99205 OFFICE O/P NEW HI 60 MIN: CPT | Performed by: PSYCHIATRY & NEUROLOGY

## 2019-05-09 PROCEDURE — 2580000003 HC RX 258: Performed by: NURSE PRACTITIONER

## 2019-05-09 PROCEDURE — 97530 THERAPEUTIC ACTIVITIES: CPT

## 2019-05-09 PROCEDURE — 97535 SELF CARE MNGMENT TRAINING: CPT

## 2019-05-09 PROCEDURE — 96372 THER/PROPH/DIAG INJ SC/IM: CPT

## 2019-05-09 PROCEDURE — 96376 TX/PRO/DX INJ SAME DRUG ADON: CPT

## 2019-05-09 PROCEDURE — 80061 LIPID PANEL: CPT

## 2019-05-09 PROCEDURE — 6360000002 HC RX W HCPCS: Performed by: INTERNAL MEDICINE

## 2019-05-09 PROCEDURE — 36415 COLL VENOUS BLD VENIPUNCTURE: CPT

## 2019-05-09 PROCEDURE — 96375 TX/PRO/DX INJ NEW DRUG ADDON: CPT

## 2019-05-09 PROCEDURE — 6370000000 HC RX 637 (ALT 250 FOR IP): Performed by: NURSE PRACTITIONER

## 2019-05-09 PROCEDURE — 70551 MRI BRAIN STEM W/O DYE: CPT

## 2019-05-09 PROCEDURE — 6360000002 HC RX W HCPCS: Performed by: NURSE PRACTITIONER

## 2019-05-09 PROCEDURE — 2500000003 HC RX 250 WO HCPCS: Performed by: NURSE PRACTITIONER

## 2019-05-09 PROCEDURE — 96366 THER/PROPH/DIAG IV INF ADDON: CPT

## 2019-05-09 PROCEDURE — 83036 HEMOGLOBIN GLYCOSYLATED A1C: CPT

## 2019-05-09 PROCEDURE — 96365 THER/PROPH/DIAG IV INF INIT: CPT

## 2019-05-09 PROCEDURE — 6370000000 HC RX 637 (ALT 250 FOR IP): Performed by: HOSPITALIST

## 2019-05-09 PROCEDURE — 85025 COMPLETE CBC W/AUTO DIFF WBC: CPT

## 2019-05-09 PROCEDURE — 97166 OT EVAL MOD COMPLEX 45 MIN: CPT

## 2019-05-09 PROCEDURE — 83721 ASSAY OF BLOOD LIPOPROTEIN: CPT

## 2019-05-09 PROCEDURE — 80069 RENAL FUNCTION PANEL: CPT

## 2019-05-09 RX ORDER — TRAMADOL HYDROCHLORIDE 50 MG/1
100 TABLET ORAL EVERY 6 HOURS PRN
Status: DISCONTINUED | OUTPATIENT
Start: 2019-05-09 | End: 2019-05-11

## 2019-05-09 RX ORDER — PROCHLORPERAZINE EDISYLATE 5 MG/ML
10 INJECTION INTRAMUSCULAR; INTRAVENOUS ONCE
Status: COMPLETED | OUTPATIENT
Start: 2019-05-09 | End: 2019-05-09

## 2019-05-09 RX ORDER — TRAMADOL HYDROCHLORIDE 50 MG/1
50 TABLET ORAL EVERY 6 HOURS PRN
Status: DISCONTINUED | OUTPATIENT
Start: 2019-05-09 | End: 2019-05-11

## 2019-05-09 RX ORDER — DEXAMETHASONE SODIUM PHOSPHATE 4 MG/ML
10 INJECTION, SOLUTION INTRA-ARTICULAR; INTRALESIONAL; INTRAMUSCULAR; INTRAVENOUS; SOFT TISSUE ONCE
Status: COMPLETED | OUTPATIENT
Start: 2019-05-09 | End: 2019-05-09

## 2019-05-09 RX ORDER — KETOROLAC TROMETHAMINE 30 MG/ML
30 INJECTION, SOLUTION INTRAMUSCULAR; INTRAVENOUS ONCE
Status: COMPLETED | OUTPATIENT
Start: 2019-05-09 | End: 2019-05-09

## 2019-05-09 RX ORDER — DIPHENHYDRAMINE HYDROCHLORIDE 50 MG/ML
25 INJECTION INTRAMUSCULAR; INTRAVENOUS ONCE
Status: COMPLETED | OUTPATIENT
Start: 2019-05-09 | End: 2019-05-09

## 2019-05-09 RX ADMIN — DEXAMETHASONE SODIUM PHOSPHATE 10 MG: 4 INJECTION, SOLUTION INTRAMUSCULAR; INTRAVENOUS at 15:21

## 2019-05-09 RX ADMIN — KETOROLAC TROMETHAMINE 30 MG: 30 INJECTION, SOLUTION INTRAMUSCULAR at 15:23

## 2019-05-09 RX ADMIN — BACLOFEN 10 MG: 10 TABLET ORAL at 20:03

## 2019-05-09 RX ADMIN — ASPIRIN 81 MG: 81 TABLET, COATED ORAL at 09:31

## 2019-05-09 RX ADMIN — DIPHENHYDRAMINE HYDROCHLORIDE 25 MG: 50 INJECTION INTRAMUSCULAR; INTRAVENOUS at 15:22

## 2019-05-09 RX ADMIN — VALPROATE SODIUM 500 MG: 100 INJECTION, SOLUTION INTRAVENOUS at 15:21

## 2019-05-09 RX ADMIN — TRAMADOL HYDROCHLORIDE 100 MG: 50 TABLET, FILM COATED ORAL at 22:06

## 2019-05-09 RX ADMIN — PROCHLORPERAZINE EDISYLATE 10 MG: 5 INJECTION INTRAMUSCULAR; INTRAVENOUS at 15:40

## 2019-05-09 RX ADMIN — FAMOTIDINE 10 MG: 20 TABLET ORAL at 09:31

## 2019-05-09 RX ADMIN — FAMOTIDINE 10 MG: 20 TABLET ORAL at 20:02

## 2019-05-09 RX ADMIN — HEPARIN SODIUM 5000 UNITS: 5000 INJECTION, SOLUTION INTRAVENOUS; SUBCUTANEOUS at 20:03

## 2019-05-09 RX ADMIN — POTASSIUM CHLORIDE AND SODIUM CHLORIDE: 900; 300 INJECTION, SOLUTION INTRAVENOUS at 01:01

## 2019-05-09 RX ADMIN — SODIUM CHLORIDE, PRESERVATIVE FREE 10 ML: 5 INJECTION INTRAVENOUS at 20:03

## 2019-05-09 RX ADMIN — POTASSIUM CHLORIDE AND SODIUM CHLORIDE: 900; 300 INJECTION, SOLUTION INTRAVENOUS at 15:21

## 2019-05-09 RX ADMIN — BACLOFEN 10 MG: 10 TABLET ORAL at 15:21

## 2019-05-09 RX ADMIN — BACLOFEN 10 MG: 10 TABLET ORAL at 09:31

## 2019-05-09 RX ADMIN — HEPARIN SODIUM 5000 UNITS: 5000 INJECTION, SOLUTION INTRAVENOUS; SUBCUTANEOUS at 15:22

## 2019-05-09 RX ADMIN — SUCRALFATE 1 G: 1 TABLET ORAL at 20:02

## 2019-05-09 RX ADMIN — TRAMADOL HYDROCHLORIDE 100 MG: 50 TABLET, FILM COATED ORAL at 12:37

## 2019-05-09 RX ADMIN — ATORVASTATIN CALCIUM 40 MG: 40 TABLET, FILM COATED ORAL at 20:02

## 2019-05-09 RX ADMIN — VALPROATE SODIUM 500 MG: 100 INJECTION, SOLUTION INTRAVENOUS at 23:13

## 2019-05-09 RX ADMIN — LEVOTHYROXINE SODIUM 125 MCG: 0.12 TABLET ORAL at 09:31

## 2019-05-09 RX ADMIN — HEPARIN SODIUM 5000 UNITS: 5000 INJECTION, SOLUTION INTRAVENOUS; SUBCUTANEOUS at 05:43

## 2019-05-09 RX ADMIN — SUCRALFATE 1 G: 1 TABLET ORAL at 09:31

## 2019-05-09 ASSESSMENT — PAIN DESCRIPTION - LOCATION
LOCATION: ABDOMEN
LOCATION: ABDOMEN

## 2019-05-09 ASSESSMENT — PAIN SCALES - GENERAL
PAINLEVEL_OUTOF10: 7
PAINLEVEL_OUTOF10: 3
PAINLEVEL_OUTOF10: 7
PAINLEVEL_OUTOF10: 4
PAINLEVEL_OUTOF10: 7
PAINLEVEL_OUTOF10: 3

## 2019-05-09 ASSESSMENT — PAIN DESCRIPTION - PAIN TYPE
TYPE: ACUTE PAIN

## 2019-05-09 NOTE — PROGRESS NOTES
Physical Therapy  Attempted to see pt this afternoon for PT eval. Pt off unit at this time. Will continue to check back and see when available.

## 2019-05-09 NOTE — CONSULTS
Nephrology Service Consultation        [address], [address] [address]  Phone: [phone]  Office Hours: 8:30AM - 4:30PM  Monday - Friday           Patient:  Clemencia Powell  MRN: [id number]  Consulting physician:  Eva Gastelum MD  Reason for Consult: elevated creatinine      PCP: Geri Hill MD    HISTORY OF PRESENT ILLNESS:   The patient is a 64 y.o. female with RA since January 2019, presented due to feeling dizzy and fatigued. She has also had more diarrhea the past few days. Renal consult for cr 2.4 on admission when her baseline cr is 1.  K was also 2.8 on admission  She has not taken any nsaids. He oral intake has not been the best for the past few days. REVIEW OF SYSTEMS:  14 point ROS is Negative. See positive ROS per HPI    Past Medical History:        Diagnosis Date    Anxiety     \"work related\"    Arthritis     Chest pain     Fatigue     Fatigue     \"increased fatigue, random fever, headaches- doing lumbar puncture to check this\"(scheduled 9/11/2013)    Generalized anxiety disorder     GERD (gastroesophageal reflux disease)     Occasionally    H/O cardiovascular stress test 1/13/2014    cardiolite-no ischemia, EF 70%,normal    H/O CT scan of chest 12/23/13    Right upper lobe pulmonary nodule continues to shrink in size. Mediastinal and hilar adenopathy  are present but also significant improved.  Histoplasmosis     History of cardiac monitoring 1/13/14    Event - NSR    History of echocardiogram 1/13/2014    EF 55% mild TR    History of histoplasmosis     History of hysterectomy     History of kidney stones     \"8 yrs ago- passed them on my own\"    History of lymph node biopsy     Hypertension     Hypothyroidism     Kidney stones     Migraines     \"have had a constant migraine for the past 6 weeks, gets worse at times but never goes away\"    Mild tricuspid regurgitation 1/13/2014    Peptic ulcer     Peptic ulcer     As a teenager.     Shortness of breath     SOB (shortness of breath)     Syncope and collapse     Thyroid disease        Past Surgical History:        Procedure Laterality Date    APPENDECTOMY      APPENDECTOMY  1979(pc)    CHOLECYSTECTOMY      CHOLECYSTECTOMY  2008(pc)    Lap Choley    COLONOSCOPY  2008    DILATION AND CURETTAGE OF UTERUS  2008    ENDOSCOPY, COLON, DIAGNOSTIC  2008    HYSTERECTOMY  2008    per old chart pt had exp. laparotomy with lysis of adhesions and JACK/BSO done 2008(pc)    KNEE SURGERY Left 11/2012    \"ACL surgery with a scope\"    MEDIASTINOSCOPY  11/05/2013    OTHER SURGICAL HISTORY  11 05 2013    medianstinoscopy    JACK AND BSO      UPPER GASTROINTESTINAL ENDOSCOPY N/A 3/19/2019    EGD BIOPSY performed by Samantha Varner MD at [facility] ENDOSCOPY       Medications:   Prior to Admission medications    Medication Sig Start Date End Date Taking? Authorizing Provider   amLODIPine (NORVASC) 5 MG tablet Take 5 mg by mouth daily 4/15/19   Historical Provider, MD   chlorthalidone (HYGROTEN) 50 MG tablet Take 50 mg by mouth daily 4/17/19   Historical Provider, MD   valsartan (DIOVAN) 320 MG tablet Take 320 mg by mouth daily 4/15/19   Historical Provider, MD   etodolac (LODINE) 500 MG tablet Take 500 mg by mouth 2 times daily 4/22/19   Historical Provider, MD   baclofen (LIORESAL) 10 MG tablet Take 10 mg by mouth 3 times daily 5/3/19   Historical Provider, MD   dicyclomine (BENTYL) 20 MG tablet Take 1 tablet by mouth 3 times daily as needed (stomach spasms) 3/19/19   Megan Gutierrez MD   levothyroxine (SYNTHROID) 125 MCG tablet Take 1 tablet by mouth daily 3/19/19   Megan Gutierrez MD   methotrexate (RHEUMATREX) 2.5 MG chemo tablet Take 12.5 mg by mouth once a week    Historical Provider, MD   predniSONE (DELTASONE) 10 MG tablet Take 10 mg by mouth daily Indications: unsure of dose     Historical Provider, MD        Allergies:   Other; Pcn [penicillins]; and Pcn [penicillins]    Social History:   TOBACCO: reports that she has never smoked. She has never used smokeless tobacco.  ETOH:   reports that she drinks alcohol. OCCUPATION:      Family History:       Problem Relation Age of Onset    Other Mother         lung neoplasm, malignant    Cancer Mother         lung ca    Heart Disease Mother     Other Father         lymphoma    Cancer Father         lymphoma    High Blood Pressure Sister     Heart Disease Sister     Stroke Sister         tia    Diabetes Sister     Heart Disease Maternal Grandfather            Physical Exam:    Vitals: /62   Pulse 60   Temp 97.4 °F (36.3 °C) (Oral)   Resp 17   Ht 5' 7\" (1.702 m)   Wt 272 lb (123.4 kg)   SpO2 95%   BMI 42.60 kg/m²   General appearance: in no acute distress, appears stated age  Skin: Skin color, texture, turgor normal. No rashes or lesions  HEENT: normocephalic, atraumatic  Neck: supple, trachea midline  Lungs: clear to auscultation bilaterally, breathing comfortably  Heart[de-identified] regular rate and rhythm, S1, S2 normal,  Abdomen: soft, non-tender; bowel sounds normal; no masses,   Extremities: extremities normal, atraumatic, no cyanosis or edema  Neurologic: Mental status: alert, oriented, interactive, following commands  Psychiatric: mood and affect appropriate    CBC:   Recent Labs     05/08/19 1738 05/09/19 0318   WBC 12.9* 9.3   HGB 13.1 11.3*    185     BMP:    Recent Labs     05/08/19 1738 05/08/19  1740 05/09/19 0318     --  141   K 2.8  K CALLED TO DR FUNES AT 1821 ON [id number] BY [facility]   RESULTS READ BACK  *  --  3.5   CL 97*  --  103   CO2 24  --  27   BUN 40*  --  35*   CREATININE 2.4*  --  1.9*   GLUCOSE 138* 132 88     Hepatic:   Recent Labs     05/08/19 1738   AST 18   ALT 26   BILITOT 0.5   ALKPHOS 92     Troponin: No results for input(s): TROPONINI in the last 72 hours. BNP: No results for input(s): BNP in the last 72 hours.   Lipids:   Recent Labs     05/09/19 0318   CHOL 163   HDL 36*     ABGs: No results found for: PHART, PO2ART, [id number]  INR:   Recent Labs     05/08/19  1738   INR 0.98       No intake/output data recorded. No intake/output data recorded.      -----------------------------------------------------------------      Assessment and Recommendations     - LIVIER from volume depletion, cr 2.4 on admission, baseline cr 1; renal US showed no HN, UA with 1rbcs and 3wbcs  - Hypokalemia  - Lightheadedness  - RA    Plan  Continue NS+K  Hold antihypertensives  Will follow    Thank you      Electronically signed by Leisa Das DO on 5/9/2019 at 7:56 AM    [address] Curry DO [place] ,  [facility] [address]  PHONE: [phone]  FAX: [phone]

## 2019-05-09 NOTE — PROGRESS NOTES
.Progress Note (Hospitalist, Internal Medicine)  IDENTIFYING INFORMATION   PATIENT:  Clemencia Powell  MRN:  [id number]  ADMIT DATE: 5/8/2019  TIME OF EVALUATION: 5/9/2019 10:58 AM      HISTORY OF PRESENT ILLNESS   Clemencia Powell is a 64 y.o. female who presents with left facial numbness, headache, photophobia and lightheadedness with slurred speech. She was admitted for concerns for CVA. Initial head imaging , head CT, CTA, done was negative. Patient on presentation also had acute kidney injury and was started on IV fluid resuscitation with an improvement in renal function. She also had hypokalemia on presentation. She has hypertension and blood pressure was controlled on presentation. Neurology as well as nephrology consulted  SUBJECTIVE     Patient reports having chronic diarrhea which had been ongoing for about 6 weeks  And she has been follow-up with GI, has had an EGD as well as colonoscopy done and is currently on antidiarrheal medication with no improvement. She reports having about 5 watery stools a day and obviously she is not drinking enough to replace her fluid loss. On presentation yesterday she was hypotensive with blood pressure going down to as low as in the 80s and this could be the cause of her neurologic presentation. She complains of a headache but reports resolution of the blurry vision. She also reports of foggy memory and residual slurred speech. Patient to get an MRI.     MEDICATIONS   Medications Prior to Admission  Medications Prior to Admission: amLODIPine (NORVASC) 5 MG tablet, Take 5 mg by mouth daily  chlorthalidone (HYGROTEN) 50 MG tablet, Take 50 mg by mouth daily  valsartan (DIOVAN) 320 MG tablet, Take 320 mg by mouth daily  etodolac (LODINE) 500 MG tablet, Take 500 mg by mouth 2 times daily  baclofen (LIORESAL) 10 MG tablet, Take 10 mg by mouth 3 times daily  dicyclomine (BENTYL) 20 MG tablet, Take 1 tablet by mouth 3 times daily as needed (stomach spasms)  levothyroxine (SYNTHROID) 125 MCG tablet, Take 1 tablet by mouth daily  methotrexate (RHEUMATREX) 2.5 MG chemo tablet, Take 12.5 mg by mouth once a week  predniSONE (DELTASONE) 10 MG tablet, Take 10 mg by mouth daily Indications: unsure of dose     Current Medications  Current Facility-Administered Medications   Medication Dose Route Frequency Provider Last Rate Last Dose    baclofen (LIORESAL) tablet 10 mg  10 mg Oral TID KARTIK Hagen CNP   10 mg at 05/09/19 0931    dicyclomine (BENTYL) tablet 20 mg  20 mg Oral TID PRN KARTIK Hagen CNP        levothyroxine (SYNTHROID) tablet 125 mcg  125 mcg Oral Daily KARTIK Hagen CNP   125 mcg at 05/09/19 0931    sodium chloride flush 0.9 % injection 10 mL  10 mL Intravenous 2 times per day KARTIK Hagen CNP   10 mL at 05/08/19 2233    sodium chloride flush 0.9 % injection 10 mL  10 mL Intravenous PRN KARTIK Raman CNP        magnesium hydroxide (MILK OF MAGNESIA) 400 MG/5ML suspension 30 mL  30 mL Oral Daily PRN KARTIK Hagen CNP        ondansetron (ZOFRAN) injection 4 mg  4 mg Intravenous Q6H PRN KARTIK Hagen CNP        aspirin EC tablet 81 mg  81 mg Oral Daily KARTIK Raman CNP   81 mg at 05/09/19 0931    sucralfate (CARAFATE) tablet 1 g  1 g Oral BID KARTIK Hagen CNP   1 g at 05/09/19 0931    famotidine (PEPCID) tablet 10 mg  10 mg Oral BID KARTIK Hagen CNP   10 mg at 05/09/19 0931    atorvastatin (LIPITOR) tablet 40 mg  40 mg Oral Nightly KARTIK Raman CNP   40 mg at 05/08/19 2232    heparin (porcine) injection 5,000 Units  5,000 Units Subcutaneous 3 times per day KARTIK Hagen CNP   5,000 Units at 05/09/19 0543    potassium chloride (KLOR-CON M) extended release tablet 40 mEq  40 mEq Oral PRN KARTIK Hagen CNP        Or    potassium chloride (KLOR-CON) packet 40 mEq  40 mEq Oral PRN KARTIK Hagen CNP        Or   [facility] potassium chloride 10 mEq/100 mL IVPB (Peripheral Line)  10 mEq Intravenous PRN Minnie Severs, APRN - CNP        0.9% NaCl with KCl 40 mEq infusion   Intravenous Continuous Yung Wilson  mL/hr at 05/09/19 0101           Allergies  Allergies   Allergen Reactions    Other Anaphylaxis     allergic to cilantro \"trouble breathing\"    Pcn [Penicillins] Nausea Only    Pcn [Penicillins] Nausea And Vomiting       REVIEW OF SYSTEMS     Within above limitations. 14 point review of systems reviewed. Pertinent positive or negative as per HPI or otherwise negative per 14 point systems review. Reviewed 5/9/2019 at 10:58 AM    PHYSICAL EXAM       Blood pressure 111/65, pulse 62, temperature 97.9 °F (36.6 °C), temperature source Oral, resp. rate 15, height 5' 7\" (1.702 m), weight 278 lb (126.1 kg), SpO2 96 %. General - AAO x 3  Psych - Appropriate affect/speech. No agitation  Eyes - Eye lids intact. No scleral icterus  ENT - Lips wnl. External ear clear/dry/intact. No thyromegaly on inspection  Neuro - No gross peripheral or central neuro deficits on inspection  Heart - Sinus. RRR. S1 and S2 present. No added HS/murmurs appreciated. No elevated JVD appreciated  Lung - Adequate air entry b/l, No crackles/wheezes appreciated  GI -  Soft. No guarding/rigidity. No hepatosplenomegaly/ascites. BS+  Skin - Intact. No rash/petechiae/ecchymosis. Warm extremities  MSK - Joints with normal ROM.  No joint swellings      Lines/Drains/Airways/Wounds:  [unfilled]    LABS AND IMAGING   CBC  [unfilled]    Last 3 Hemoglobin  Lab Results   Component Value Date    HGB 11.3 05/09/2019    HGB 13.1 05/08/2019    HGB 11.3 03/18/2019     Last 3 WBC/ANC  Lab Results   Component Value Date    WBC 9.3 05/09/2019    WBC 12.9 05/08/2019    WBC 6.0 03/18/2019     No components found for: GRNLOCTYABS  Last 3 Platelets  No results found for: PLATELET  Chemistry  [unfilled]  [unfilled]  Lab Results   Component Value Date

## 2019-05-09 NOTE — PROGRESS NOTES
Arthritis, Chest pain, Fatigue, Fatigue, Generalized anxiety disorder, GERD (gastroesophageal reflux disease), H/O cardiovascular stress test, H/O CT scan of chest, Histoplasmosis, History of cardiac monitoring, History of echocardiogram, History of histoplasmosis, History of hysterectomy, History of kidney stones, History of lymph node biopsy, Hypertension, Hypothyroidism, Kidney stones, Migraines, Mild tricuspid regurgitation, Peptic ulcer, Peptic ulcer, Shortness of breath, SOB (shortness of breath), Syncope and collapse, and Thyroid disease. has a past surgical history that includes Appendectomy; Cholecystectomy; russell and bso (cervix removed); Appendectomy (1979(pc)); Cholecystectomy (2008(pc)); Hysterectomy (2008); Endoscopy, colon, diagnostic (2008); Colonoscopy (2008); Dilation and curettage of uterus (2008); knee surgery (Left, 11/2012); other surgical history (11 05 2013); Mediastinoscopy (11/05/2013); and Upper gastrointestinal endoscopy (N/A, 3/19/2019). Restrictions  Restrictions/Precautions  Restrictions/Precautions: General Precautions, Fall Risk  Position Activity Restriction  Other position/activity restrictions: Cleared by Leonor ZIMMERMAN, for OT eval. PIV, tele. Vitals WFL during therapy session. Subjective   General  Chart Reviewed: Yes  Patient assessed for rehabilitation services?: Yes  Family / Caregiver Present: No  Subjective  Subjective: Pt lying in bed, agreable to services. Pleasant and cooperative. States that she feels like internally she is leaning to L side when she ambulates.      Social/Functional History  Social/Functional History  Lives With: Family(spouse, grandson, granddtr and 2 great grand children; 2 dogs (one dog is blind, gets under feet at times))  Type of Home: House  Home Layout: Multi-level  Home Access: (1 set of 6 steps to enter, R asc HR; 6 steps to 2nd level w/bed/bathroom/kitchen/living area (pt able to stay on this level, W/D just moved up here))  Bathroom Shower/Tub: Walk-in shower(built in shower seat)  Bathroom Toilet: Handicap height  Bathroom Equipment: Grab bars in shower, Hand-held shower, Grab bars around toilet  ADL Assistance: [address]: Independent  Ambulation Assistance: Independent  Transfer Assistance: Independent  Active : Yes  Occupation: Full time employment  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 Spouse is very busy w/business and grandchildren work F/T jobs so pt has to be indep in caring for self. Objective   Vision: Impaired  Vision Exceptions: Wears glasses for reading(does not c/o any visual changes)  Hearing: Within functional limits    Orientation  Overall Orientation Status: Within Functional Limits  Observation/Palpation  Posture: Good(but does want to reach for environmental supports w/ambulation)  Balance  Sitting Balance: Supervision(dynamic on side of bed to adjust footies and w/hygiene w/toileting)  Standing Balance: Contact guard assistance(light dynamic at sink for hand hygiene)     Tone RUE  RUE Tone: Normotonic  Tone LUE  LUE Tone: Normotonic     Bed mobility  Supine to Sit: Modified independent(w/HOB raised at 60* and use of bed rail)  Scooting: Modified independent(seated to side of bed)  Transfers  Stand Pivot Transfers: Contact guard assistance(to min A w/o AD to toilet and chair)  Sit to stand: Contact guard assistance(from bed, toilet)  Stand to sit: Contact guard assistance(to toilet, chair)  Transfer Comments: amb w/o AD in room at [facility]. Pt reaching for objects in environment for support, reports she feels as if she's leaning to L during ambulation.  Pt would benefit from trialing pending progress at mod indep. Short term goal 2: Pt will complete shower process w/use of seat at s/u and then mod indep. Short term goal 3: Pt will complete toileting at mod indep. Short term goal 4: Pt will complete resistive UE ther ex at indep w/use of hand out. Short term goal 5: Pt will complete full grooming routine standing at sink at mod indep.        Therapy Time   Individual Concurrent Group Co-treatment   Time In 1155         Time Out 1235         Minutes 40         Timed Code Treatment Minutes: 25 Minutes       Emily Peterson, OT/L

## 2019-05-09 NOTE — CARE COORDINATION
Patient discharged home with [address] on 5/13/2019    CN met with patient at on 5/9, Dr Nurys Flores in room with patient. Patient in bed resting, able to do education. States I had this weird feeling that the top of my head had pressure and I was confused. I have been having stomach issues for 10 weeks and I don't think my doctor is listening to me. I have lost 30 lbs and he does not want to do anything except give me more pills. STROKE INITIAL ASSESSMENT      What symptoms brought you in to the hospital? Patient came in with dizziness, headache, photophobia, lightheadedness, paresthesias. Headache started behind left eye and usually has headaches behind right eye. Last Known Well:5/8 1600    Do you have a Neurologist?  Name:    Where do you live:       [x] Home with spouse       [] Independent Living     [] Assisted Living                  [] [address]   [] Homeless/Homeless Shelter   [] Group Home    Primary Physician:   [x]Dr Yoni Chávez   [] None    [] PA/NP   [] [facility] Phys[facility]    Pharmacy:        [] [facility] Zavala        [] [facility]        [] Marylene Jabs   [x] [facility]      [] Shavon Parks    [] [facility]   [] Lindy Lopez              [] Nursing Home    [] [place] [address]   [] [place]   [] [address] [place] [address]    [] [facility]   [] [facility]   [] [employer] [facility]   [] Other:          Meds to Beds:   [] Yes  [x] No  Home Care:         [] Yes, Name:       [x] No            SNF:  [] Yes, Name:   [x] No      Do you take a blood thinner? No  Do you take a statins? No    Are you out of any of your  home medications? [] Yes   [x] No  Can you pay for your meds? [x] Yes   [] No  Do you have transportation:            [x] Yes   [] No          What time do you prefer your appointments:  [] AM   [x] PM  [] Anytime    Goals for this admission:   Patient wants to:get better    Steps to meet goal:   1. Medications   2. Work with therapy   3.         CN provided education to patient on reducing risk for stroke/TIA by modifying /controlling risk factors:of Velora Sis HTN.Family history of Heart Disease and Stroke, Obesity. .. Instructed to take the medications as prescribed and dont stop unless ordered by a physician. Educated on  need to follow-up with physician after discharge . Discussed benefits of eating a healthy diet, regularly exercising. Copy of educational materials given to the patient/caregiver to take home, reviewed signs and symptoms of stroke, including sudden numbness, dizziness, or loss of coordination or balance; weakness on one side of the body, sudden confusion, difficulty speaking, understanding or swallowing, sudden loss of vision, or severe, sudden headache. Emphasized the need to call 911 immediately if they are experiencing signs and symptoms of stroke. FAST education provided, FAST magnet given to patient. All education with teachback and questions answered     CN Contact information card given to patient/caregiver    Dysphagia screen done prior to any oral intake (including meds)                                  Yes    Date & Time of screening-5/08 1850  Date & time of first medication-5/08 2232  Did the pt fail the dysphagia screen? If yes,call the physician for SLP order, make the patient NPO and change oral medications to other routes  No    VTE Prophylaxis given by day 2 of admission ( day 1 starts on adm date,this excludes obs status and ED)  Yes  If VTE not ordered, did the physician chart BOTH a pharmacological and mechanical reasons ( in context to VTE) why it wan not ordered? NA  Was the patient given an antithrombotic by end of day 2? (day 1 starts on patients arrival date)  Yes  If the pt did not have an order for antithrombotic by day 2, did the physician document why the pt did not receive it? ( NPO status and an allergy to an antithrombotic are not acceptable reasons)  NA    Did the pt receive education on how to call 911 and documented that handout was given to pt/caregiver?   Yes  Did the pt receive education

## 2019-05-09 NOTE — CONSULTS
Neurology Service Consult Note  [facility]   Patient Name: Segundo Prescott  : 1960        Subjective:   Reason for consult: I had fireworks in my brain  64 y.o. right-handed female past medical history thyroid disease, syncope, shortness of breath, peptic ulcer disease, in TR, migraines, kidney stones, hypothyroid, hypertension, histoplasmosis disseminated, GERD, generalized anxiety disorder, fatigue, chest pain, arthritis and anxiety presenting to [facility] with complaints of left-sided facial numbness that started 1 day prior to examination, patient reports that she developed a sensation of fireworks in her head that is described as tingling crackling disseminated sensation that was at the top of the head went throughout the head did not radiate to the neck, then she noticed that she was cognitively slow, not making fast decisions at work, she then felt lightheaded, had some rooms any dizziness and felt unsteady on her feet, her grandchildren noticed she was not acting herself, then she noticed left facial numbness that radiated to the left arm and left leg. Patient does not have any facial droop, aphasia or dysarthria, she did not lose strength in the left arm and the left leg. Patient does have a history of migraine headaches, states that this is not consistent with her typical sharp stabbing 10 out of 10 unilateral pain but does appreciate there is a tall underlying aching pain 4 out of 10 with associated photosensitivity. She has a history of disseminated histoplasmosis has had multiple scans of her brain and spine with lumbar punctures, this is fully resolved. Nuys history of type 2 diabetes, or bilateral lower tree peripheral neuropathy. Does appreciate a history of rheumatoid arthritis, and is on methotrexate. States with symptoms 1 day prior to exam she had blurred vision but no double vision or loss of vision.     Does appreciate that the \"fireworks\" feeling resolved within 1-4 hours, however left facial numbness into the arm and leg still remain on exam.    Report that her job and she isn't  is very stressful, and it is more stressful than usual at this time, states she  Typically arrives off stress but has been feeling more overwhelmed isn't typical at this time. He does not chest pain shortness of breath. No prior history of stroke or seizure. Did not have any loss of consciousness history. Did state that she fell yesterday twice. No family at the bedside. Is not on aspirin or statin home.  does report that she's been struggling for 8 weeks with 5-6 episodes of diarrhea daily, states she feels dehydrated generally fatigued from this, states that she's had multiple GI workups with no etiology. Past Medical History:   Diagnosis Date    Anxiety     \"work related\"    Arthritis     Chest pain     Fatigue     Fatigue     \"increased fatigue, random fever, headaches- doing lumbar puncture to check this\"(scheduled 9/11/2013)    Generalized anxiety disorder     GERD (gastroesophageal reflux disease)     Occasionally    H/O cardiovascular stress test 1/13/2014    cardiolite-no ischemia, EF 70%,normal    H/O CT scan of chest 12/23/13    Right upper lobe pulmonary nodule continues to shrink in size. Mediastinal and hilar adenopathy  are present but also significant improved.  Histoplasmosis     History of cardiac monitoring 1/13/14    Event - NSR    History of echocardiogram 1/13/2014    EF 55% mild TR    History of histoplasmosis     History of hysterectomy     History of kidney stones     \"8 yrs ago- passed them on my own\"    History of lymph node biopsy     Hypertension     Hypothyroidism     Kidney stones     Migraines     \"have had a constant migraine for the past 6 weeks, gets worse at times but never goes away\"    Mild tricuspid regurgitation 1/13/2014    Peptic ulcer     Peptic ulcer     As a teenager.     Shortness of breath     SOB (shortness of breath)     Syncope and collapse     Thyroid disease     :   Past Surgical History:   Procedure Laterality Date    APPENDECTOMY      APPENDECTOMY  1979(pc)    CHOLECYSTECTOMY      CHOLECYSTECTOMY  2008(pc)    Lap Choley    COLONOSCOPY  2008    DILATION AND CURETTAGE OF UTERUS  2008    ENDOSCOPY, COLON, DIAGNOSTIC  2008    HYSTERECTOMY  2008    per old chart pt had exp.  laparotomy with lysis of adhesions and JACK/BSO done 2008(pc)    KNEE SURGERY Left 11/2012    \"ACL surgery with a scope\"    MEDIASTINOSCOPY  11/05/2013    OTHER SURGICAL HISTORY  11 05 2013    medianstinoscopy    JACK AND BSO      UPPER GASTROINTESTINAL ENDOSCOPY N/A 3/19/2019    EGD BIOPSY performed by Severiano Matthews MD at [facility] ENDOSCOPY     Medications:  Scheduled Meds:   baclofen  10 mg Oral TID    levothyroxine  125 mcg Oral Daily    sodium chloride flush  10 mL Intravenous 2 times per day    aspirin  81 mg Oral Daily    sucralfate  1 g Oral BID    famotidine  10 mg Oral BID    atorvastatin  40 mg Oral Nightly    heparin (porcine)  5,000 Units Subcutaneous 3 times per day     Continuous Infusions:   0.9% NaCl with KCl 40 mEq 100 mL/hr at 05/09/19 0101     PRN Meds:.dicyclomine, sodium chloride flush, magnesium hydroxide, ondansetron, potassium chloride **OR** potassium alternative oral replacement **OR** potassium chloride    Allergies   Allergen Reactions    Other Anaphylaxis     allergic to cilantro \"trouble breathing\"    Pcn [Penicillins] Nausea Only    Pcn [Penicillins] Nausea And Vomiting     Social History     Socioeconomic History    Marital status:      Spouse name: Not on file    Number of children: Not on file    Years of education: Not on file    Highest education level: Not on file   Occupational History    Not on file   Social Needs    Financial resource strain: Not on file    Food insecurity:     Worry: Not on file     Inability: Not on file    Transportation needs:     Medical: Not on file Non-medical: Not on file   Tobacco Use    Smoking status: Never Smoker    Smokeless tobacco: Never Used    Tobacco comment: exposed to heavy 2nd hand smoke x 18 yrs per parents   Substance and Sexual Activity    Alcohol use: Yes     Comment: glass of wine one time per month at the most     CAFFEINE: 1 cup coffee or 1 soda daily.  Drug use: No    Sexual activity: Yes     Partners: Male     Comment:    Lifestyle    Physical activity:     Days per week: Not on file     Minutes per session: Not on file    Stress: Not on file   Relationships    Social connections:     Talks on phone: Not on file     Gets together: Not on file     Attends Cheondoism service: Not on file     Active member of club or organization: Not on file     Attends meetings of clubs or organizations: Not on file     Relationship status: Not on file    Intimate partner violence:     Fear of current or ex partner: Not on file     Emotionally abused: Not on file     Physically abused: Not on file     Forced sexual activity: Not on file   Other Topics Concern    Not on file   Social History Narrative    ** Merged History Encounter **           Family History   Problem Relation Age of Onset    Other Mother         lung neoplasm, malignant    Cancer Mother         lung ca    Heart Disease Mother     Other Father         lymphoma    Cancer Father         lymphoma    High Blood Pressure Sister     Heart Disease Sister     Stroke Sister         tia    Diabetes Sister     Heart Disease Maternal Grandfather        Review of Symptoms:    10-point system review completed. All of which are negative except as mentioned above. Physical Exam:         Gen: A&O x 4, NAD, cooperative  HEENT: NC/AT, EOMI, PERRL, mmm, no carotid bruits, neck supple, no meningeal signs;   Heart: Regular   Lungs: no distress  Ext: no edema, no calf tenderness b/l  Psych: normal mood and affect  Skin: no rashes or lesions    NEUROLOGIC EXAM:    Mental Status: A&O to self, location, month and year, NAD, speech clear, language fluent, repetition and naming intact, follows commands appropriately    Cranial Nerve Exam:   CN II-XII: , PERRL, VFF, no nystagmus, no gaze paresis, sensation V1-V3 intact b/l, muscles of facial expression symmetric; hearing intact to conversational tone, palate elevates symmetrically, shoulder elevation symmetric and tongue protrudes midline with movement side to side. Motor Exam:       Strength 5/5 UE's/LE's b/l  Tone and bulk normal   No pronator drift    Deep Tendon Reflexes: 2/4 biceps, triceps, brachioradialis, 1/4 patellar, and achilles b/l; flexor plantar responses b/l    Sensation: Intact light touch/pinprick/vibration UE's/LE's b/l-->all decreased in stocking distribution with functional decreased pinprick on the left arm, leg and face, splits at midline     Coordination/Cerebellum:       Tremors--none      Rapidly alternating movements: no dysdiadochokinesia b/l                Gait and stance:      Gait: deferred for safety     LABS:     Recent Labs     05/08/19  1738 05/08/19  1740 05/09/19  0318   WBC 12.9*  --  9.3     --  141   K 2.8  K CALLED TO DR CRUM [facility] AT 1821 ON [id number] BY [facility]   RESULTS READ BACK  *  --  3.5   CL 97*  --  103   CO2 24  --  27   BUN 40*  --  35*   CREATININE 2.4*  --  1.9*   GLUCOSE 138* 132 88   INR 0.98  --   --    Results for Bassam Latham (MRN [id number]) as of 5/9/2019 07:53   Ref. Range 5/9/2019 03:18   Troponin T Latest Ref Range: <0.01 NG/ML <0.010   Cholesterol Latest Ref Range: <200 MG/   HDL Cholesterol Latest Ref Range: >40 MG/DL 36 (L)   LDL Direct Latest Ref Range: <100 MG/ (H)   Triglycerides Latest Ref Range: <150 MG/ (H)     Results for Bassam Latham (MRN [id number]) as of 5/9/2019 13:12   Ref. Range 5/8/2019 17:39   Vitamin B-12 Latest Ref Range: 211 - 911 pg/ml 716.3     Results for Bassam Latham (MRN [id number]) as of 5/9/2019 13:12   Ref.  Range

## 2019-05-10 LAB
ANION GAP SERPL CALCULATED.3IONS-SCNC: 15 MMOL/L (ref 4–16)
BUN BLDV-MCNC: 25 MG/DL (ref 6–23)
CALCIUM SERPL-MCNC: 9.3 MG/DL (ref 8.3–10.6)
CHLORIDE BLD-SCNC: 102 MMOL/L (ref 99–110)
CO2: 24 MMOL/L (ref 21–32)
CREAT SERPL-MCNC: 1.4 MG/DL (ref 0.6–1.1)
GFR AFRICAN AMERICAN: 47 ML/MIN/1.73M2
GFR NON-AFRICAN AMERICAN: 39 ML/MIN/1.73M2
GLUCOSE BLD-MCNC: 85 MG/DL (ref 70–99)
POTASSIUM SERPL-SCNC: 3.6 MMOL/L (ref 3.5–5.1)
SODIUM BLD-SCNC: 141 MMOL/L (ref 135–145)

## 2019-05-10 PROCEDURE — 96372 THER/PROPH/DIAG INJ SC/IM: CPT

## 2019-05-10 PROCEDURE — 6360000002 HC RX W HCPCS: Performed by: HOSPITALIST

## 2019-05-10 PROCEDURE — 6370000000 HC RX 637 (ALT 250 FOR IP): Performed by: HOSPITALIST

## 2019-05-10 PROCEDURE — 6370000000 HC RX 637 (ALT 250 FOR IP): Performed by: NURSE PRACTITIONER

## 2019-05-10 PROCEDURE — G0378 HOSPITAL OBSERVATION PER HR: HCPCS

## 2019-05-10 PROCEDURE — 36415 COLL VENOUS BLD VENIPUNCTURE: CPT

## 2019-05-10 PROCEDURE — 2500000003 HC RX 250 WO HCPCS: Performed by: NURSE PRACTITIONER

## 2019-05-10 PROCEDURE — 2700000000 HC OXYGEN THERAPY PER DAY

## 2019-05-10 PROCEDURE — G0009 ADMIN PNEUMOCOCCAL VACCINE: HCPCS | Performed by: HOSPITALIST

## 2019-05-10 PROCEDURE — 2580000003 HC RX 258: Performed by: NURSE PRACTITIONER

## 2019-05-10 PROCEDURE — 97116 GAIT TRAINING THERAPY: CPT

## 2019-05-10 PROCEDURE — 93010 ELECTROCARDIOGRAM REPORT: CPT | Performed by: INTERNAL MEDICINE

## 2019-05-10 PROCEDURE — 97162 PT EVAL MOD COMPLEX 30 MIN: CPT

## 2019-05-10 PROCEDURE — 99233 SBSQ HOSP IP/OBS HIGH 50: CPT | Performed by: NURSE PRACTITIONER

## 2019-05-10 PROCEDURE — 6360000002 HC RX W HCPCS: Performed by: NURSE PRACTITIONER

## 2019-05-10 PROCEDURE — 96366 THER/PROPH/DIAG IV INF ADDON: CPT

## 2019-05-10 PROCEDURE — 6370000000 HC RX 637 (ALT 250 FOR IP): Performed by: INTERNAL MEDICINE

## 2019-05-10 PROCEDURE — 96376 TX/PRO/DX INJ SAME DRUG ADON: CPT

## 2019-05-10 PROCEDURE — 94761 N-INVAS EAR/PLS OXIMETRY MLT: CPT

## 2019-05-10 PROCEDURE — 80048 BASIC METABOLIC PNL TOTAL CA: CPT

## 2019-05-10 PROCEDURE — 90670 PCV13 VACCINE IM: CPT | Performed by: HOSPITALIST

## 2019-05-10 RX ORDER — PROCHLORPERAZINE EDISYLATE 5 MG/ML
10 INJECTION INTRAMUSCULAR; INTRAVENOUS ONCE
Status: COMPLETED | OUTPATIENT
Start: 2019-05-10 | End: 2019-05-10

## 2019-05-10 RX ORDER — LACTULOSE 10 G/15ML
20 SOLUTION ORAL 3 TIMES DAILY
Status: DISCONTINUED | OUTPATIENT
Start: 2019-05-10 | End: 2019-05-10

## 2019-05-10 RX ORDER — ACETAMINOPHEN 80 MG
TABLET,CHEWABLE ORAL
Status: COMPLETED
Start: 2019-05-10 | End: 2019-05-11

## 2019-05-10 RX ORDER — GABAPENTIN 100 MG/1
100 CAPSULE ORAL 3 TIMES DAILY
Status: DISCONTINUED | OUTPATIENT
Start: 2019-05-10 | End: 2019-05-14 | Stop reason: HOSPADM

## 2019-05-10 RX ORDER — BUTALBITAL, ACETAMINOPHEN AND CAFFEINE 50; 325; 40 MG/1; MG/1; MG/1
1 TABLET ORAL EVERY 4 HOURS PRN
Status: DISCONTINUED | OUTPATIENT
Start: 2019-05-10 | End: 2019-05-11

## 2019-05-10 RX ORDER — DIPHENHYDRAMINE HYDROCHLORIDE 50 MG/ML
25 INJECTION INTRAMUSCULAR; INTRAVENOUS ONCE
Status: COMPLETED | OUTPATIENT
Start: 2019-05-10 | End: 2019-05-10

## 2019-05-10 RX ORDER — KETOROLAC TROMETHAMINE 30 MG/ML
30 INJECTION, SOLUTION INTRAMUSCULAR; INTRAVENOUS ONCE
Status: COMPLETED | OUTPATIENT
Start: 2019-05-10 | End: 2019-05-10

## 2019-05-10 RX ADMIN — VALPROATE SODIUM 500 MG: 100 INJECTION, SOLUTION INTRAVENOUS at 21:17

## 2019-05-10 RX ADMIN — BACLOFEN 10 MG: 10 TABLET ORAL at 15:22

## 2019-05-10 RX ADMIN — LACTULOSE 20 G: 10 SOLUTION ORAL at 10:33

## 2019-05-10 RX ADMIN — BACLOFEN 10 MG: 10 TABLET ORAL at 10:34

## 2019-05-10 RX ADMIN — BUTALBITAL, ACETAMINOPHEN, AND CAFFEINE 1 TABLET: 50; 325; 40 TABLET ORAL at 22:59

## 2019-05-10 RX ADMIN — LEVOTHYROXINE SODIUM 125 MCG: 0.12 TABLET ORAL at 10:33

## 2019-05-10 RX ADMIN — LACTULOSE 20 G: 10 SOLUTION ORAL at 15:21

## 2019-05-10 RX ADMIN — VALPROATE SODIUM 500 MG: 100 INJECTION, SOLUTION INTRAVENOUS at 12:44

## 2019-05-10 RX ADMIN — GABAPENTIN 100 MG: 100 CAPSULE ORAL at 21:18

## 2019-05-10 RX ADMIN — KETOROLAC TROMETHAMINE 30 MG: 30 INJECTION, SOLUTION INTRAMUSCULAR at 12:43

## 2019-05-10 RX ADMIN — SODIUM CHLORIDE, PRESERVATIVE FREE 10 ML: 5 INJECTION INTRAVENOUS at 21:17

## 2019-05-10 RX ADMIN — BACLOFEN 10 MG: 10 TABLET ORAL at 21:17

## 2019-05-10 RX ADMIN — ATORVASTATIN CALCIUM 40 MG: 40 TABLET, FILM COATED ORAL at 21:18

## 2019-05-10 RX ADMIN — PROCHLORPERAZINE EDISYLATE 10 MG: 5 INJECTION INTRAMUSCULAR; INTRAVENOUS at 12:44

## 2019-05-10 RX ADMIN — DIPHENHYDRAMINE HYDROCHLORIDE 25 MG: 50 INJECTION, SOLUTION INTRAMUSCULAR; INTRAVENOUS at 12:43

## 2019-05-10 RX ADMIN — SUCRALFATE 1 G: 1 TABLET ORAL at 21:18

## 2019-05-10 RX ADMIN — GABAPENTIN 100 MG: 100 CAPSULE ORAL at 15:21

## 2019-05-10 RX ADMIN — PNEUMOCOCCAL 13-VALENT CONJUGATE VACCINE 0.5 ML: 2.2; 2.2; 2.2; 2.2; 2.2; 4.4; 2.2; 2.2; 2.2; 2.2; 2.2; 2.2; 2.2 INJECTION, SUSPENSION INTRAMUSCULAR at 10:36

## 2019-05-10 RX ADMIN — HEPARIN SODIUM 5000 UNITS: 5000 INJECTION, SOLUTION INTRAVENOUS; SUBCUTANEOUS at 21:18

## 2019-05-10 RX ADMIN — VALPROATE SODIUM 500 MG: 100 INJECTION, SOLUTION INTRAVENOUS at 06:58

## 2019-05-10 RX ADMIN — TRAMADOL HYDROCHLORIDE 100 MG: 50 TABLET, FILM COATED ORAL at 06:10

## 2019-05-10 RX ADMIN — FAMOTIDINE 10 MG: 20 TABLET ORAL at 21:18

## 2019-05-10 RX ADMIN — FAMOTIDINE 10 MG: 20 TABLET ORAL at 10:33

## 2019-05-10 RX ADMIN — SODIUM CHLORIDE, PRESERVATIVE FREE 10 ML: 5 INJECTION INTRAVENOUS at 10:34

## 2019-05-10 RX ADMIN — HEPARIN SODIUM 5000 UNITS: 5000 INJECTION, SOLUTION INTRAVENOUS; SUBCUTANEOUS at 06:07

## 2019-05-10 RX ADMIN — HEPARIN SODIUM 5000 UNITS: 5000 INJECTION, SOLUTION INTRAVENOUS; SUBCUTANEOUS at 15:21

## 2019-05-10 RX ADMIN — ASPIRIN 81 MG: 81 TABLET, COATED ORAL at 10:33

## 2019-05-10 RX ADMIN — SUCRALFATE 1 G: 1 TABLET ORAL at 10:34

## 2019-05-10 ASSESSMENT — PAIN SCALES - GENERAL
PAINLEVEL_OUTOF10: 0
PAINLEVEL_OUTOF10: 5
PAINLEVEL_OUTOF10: 5
PAINLEVEL_OUTOF10: 4
PAINLEVEL_OUTOF10: 5
PAINLEVEL_OUTOF10: 7
PAINLEVEL_OUTOF10: 4

## 2019-05-10 ASSESSMENT — PAIN DESCRIPTION - LOCATION
LOCATION_2: HEAD
LOCATION: HEAD
LOCATION: ABDOMEN

## 2019-05-10 ASSESSMENT — PAIN DESCRIPTION - DESCRIPTORS: DESCRIPTORS_2: HEADACHE

## 2019-05-10 ASSESSMENT — PAIN DESCRIPTION - PAIN TYPE
TYPE_2: ACUTE PAIN
TYPE: ACUTE PAIN;CHRONIC PAIN

## 2019-05-10 ASSESSMENT — PAIN DESCRIPTION - INTENSITY
RATING_2: 7
RATING_2: 7

## 2019-05-10 NOTE — PROGRESS NOTES
.Progress Note (Hospitalist, Internal Medicine)  IDENTIFYING INFORMATION   PATIENT:  Urvashi Urias  MRN:  [id number]  ADMIT DATE: 5/8/2019  TIME OF EVALUATION: 5/10/2019 4:30 PM      HISTORY OF PRESENT ILLNESS   Urvashi Urias is a 64 y.o. female who presents with left facial numbness, headache, photophobia and lightheadedness with slurred speech. She was admitted for concerns for CVA. Initial head imaging , head CT, CTA, done was negative. Patient on presentation also had acute kidney injury and was started on IV fluid resuscitation with an improvement in renal function. She also had hypokalemia on presentation. She has hypertension and blood pressure was controlled on presentation. Neurology as well as nephrology consulted  SUBJECTIVE     Patient reports having chronic diarrhea which had been ongoing for about 6 weeks  And she has been follow-up with GI, has had an EGD as well as colonoscopy done and is currently on antidiarrheal medication with no improvement. She reports having about 5 watery stools a day and obviously she is not drinking enough to replace her fluid loss. On presentation yesterday she was hypotensive with blood pressure going down to as low as in the 80s and this could be the cause of her neurologic presentation. She complains of a headache but reports resolution of the blurry vision. She also reports of foggy memory and residual slurred speech. 5/10/19  Complains of persistent headache    Patient to get an MRI.     MEDICATIONS   Medications Prior to Admission  Medications Prior to Admission: amLODIPine (NORVASC) 5 MG tablet, Take 5 mg by mouth daily  chlorthalidone (HYGROTEN) 50 MG tablet, Take 50 mg by mouth daily  valsartan (DIOVAN) 320 MG tablet, Take 320 mg by mouth daily  etodolac (LODINE) 500 MG tablet, Take 500 mg by mouth 2 times daily  baclofen (LIORESAL) 10 MG tablet, Take 10 mg by mouth 3 times daily  dicyclomine (BENTYL) 20 MG tablet, Take 1 tablet by mouth 3 times

## 2019-05-10 NOTE — PROGRESS NOTES
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 Spouse is very busy w/business and grandchildren work F/T jobs so pt has to be indep in caring for self. No 24/7 available. Examination of body systems (includes body structures/functions, activity/participation limitations):  · Observation:  Supine in bed upon arrival. Stomach upset and needing to use bathroom. Agreeable to work with PT.   · Vision:  WFL with reading glasses   · Hearing:  [facility]   · Cardiopulmonary:  Stable on room air   · Orientation: [facility]     Musculoskeletal  · ROM R/L:  WFL BLEs. · Strength R/L:  Fair/good in function and endurance. · Neuro:  [facility] to extremities, numbness to face (L mostly)     Mobility/treatment:  · Rolling L/R:  NT  · Supine to sit:  Ferny slightly elevated   · Sit to supine: Ferny with HOB slightly elevated  · Transfers: sit><stand from all surfaces SBA/CGA; step pivot to standard toilet/recliner w/ RW CGA; stand pivot from recliner>bed without AD CGA  · Sitting balance:  Ferny EOB/toilet  · Standing balance:  CGA up to Janelle having 1 LOB laterally to R while performing self care tasks at toilet  · Gait: 10ft x 1 + 35ft x 1 with RW CGA for safety. Unsteady, dec pace, symmetrical step length and good foot clearance.  Dec activity tolerance feeling weak and shaky in her Treatment plan:  Strengthening; Balance Training; Transfer Training; IADL Training; Endurance Training; Gait Training; Stair training; Functional Mobility Training; ROM; ADL/Self-care Training; Neuromuscular Re-education; Home Exercise Program; Patient/Caregiver Education & Training; Modalities;  Safety Education & Training; Equipment Evaluation, Education, & procurement; Positioning    Recommendations for NURSING mobility: ambulate daily with RW    Time:   Time in: 1225  Time out: 1249  Timed treatment minutes: 14  Total time: 24    Electronically signed by:    Nikita Rees [id number]  5/10/2019, 1:15 PM

## 2019-05-10 NOTE — PROGRESS NOTES
Neurology Service Progress Note  [facility]   Patient Name: Ivy Ortiz  : 1960        Subjective:   Patient seen and examined   She states she has a generalized throbbing headache that is moderate in intensity with lightlessness and room spinning dizziness that she stats is worse with moving up and down. Today I do notice a left facial paralysis, patient does believe she cannot close the entire left eye and also appreciates a numbness there but no difficulty with swallow, no dysarthria or aphasia.      :     Medications:  Scheduled Meds:   lactulose  20 g Oral TID    diphenhydrAMINE  25 mg Intravenous Once    ketorolac  30 mg Intravenous Once    prochlorperazine  10 mg Intravenous Once    valproate sodium (DEPACON) IVPB  500 mg Intravenous Q8H    gabapentin  100 mg Oral TID    baclofen  10 mg Oral TID    levothyroxine  125 mcg Oral Daily    sodium chloride flush  10 mL Intravenous 2 times per day    aspirin  81 mg Oral Daily    sucralfate  1 g Oral BID    famotidine  10 mg Oral BID    atorvastatin  40 mg Oral Nightly    heparin (porcine)  5,000 Units Subcutaneous 3 times per day     Continuous Infusions:    PRN Meds:.traMADol **OR** traMADol, dicyclomine, sodium chloride flush, ondansetron, potassium chloride **OR** potassium alternative oral replacement **OR** potassium chloride       Review of Symptoms:    10-point system review completed. All of which are negative except as mentioned above. Physical Exam:         Gen: A&O x 4, NAD, cooperative  HEENT: NC/AT, EOMI, PERRL, mmm, no carotid bruits, neck supple, no meningeal signs;   Heart: Regular   Lungs: no distress  Ext: no edema, no calf tenderness b/l  Psych: normal mood and affect  Skin: no rashes or lesions    NEUROLOGIC EXAM:    Mental Status: A&O to self, location, month and year, NAD, speech clear, language fluent, repetition and naming intact, follows commands appropriately    Cranial Nerve Exam:   CN II-XII: , PERRL, VFF, no nystagmus, no gaze paresis, sensation V1-V3 intact b/l, muscles of facial expression asymmetric with a left facial paralysis; hearing intact to conversational tone, palate elevates symmetrically, shoulder elevation symmetric and tongue protrudes midline with movement side to side. Motor Exam:       Strength 5/5 UE's/LE's b/l  Tone and bulk normal   No pronator drift    Deep Tendon Reflexes: 2/4 biceps, triceps, brachioradialis, 1/4 patellar, and achilles b/l; flexor plantar responses b/l    Sensation: Intact light touch/pinprick/vibration UE's/LE's b/l-->all decreased in stocking distribution with functional decreased pinprick on the left arm, leg, face is appropriate     Coordination/Cerebellum:       Tremors--none      Rapidly alternating movements: no dysdiadochokinesia b/l                Gait and stance:      Gait: deferred for safety     LABS:     Recent Labs     05/08/19  1738 05/08/19  1740 05/09/19  0318   WBC 12.9*  --  9.3     --  141   K 2.8  K CALLED TO DR FUNES AT 1821 ON [id number] BY [facility]   RESULTS READ BACK  *  --  3.5   CL 97*  --  103   CO2 24  --  27   BUN 40*  --  35*   CREATININE 2.4*  --  1.9*   GLUCOSE 138* 132 88   INR 0.98  --   --    Results for Florian Seip (MRN [id number]) as of 5/9/2019 07:53   Ref. Range 5/9/2019 03:18   Troponin T Latest Ref Range: <0.01 NG/ML <0.010   Cholesterol Latest Ref Range: <200 MG/   HDL Cholesterol Latest Ref Range: >40 MG/DL 36 (L)   LDL Direct Latest Ref Range: <100 MG/ (H)   Triglycerides Latest Ref Range: <150 MG/ (H)     Results for Florian Seip (MRN [id number]) as of 5/9/2019 13:12   Ref. Range 5/8/2019 17:39   Vitamin B-12 Latest Ref Range: 211 - 911 pg/ml 716.3     Results for Florian Seip (MRN [id number]) as of 5/9/2019 13:12   Ref.  Range 5/8/2019 17:38   TSH, High Sensitivity Latest Ref Range: 0.270 - 4.20 uIu/ml 2.760       IMAGING:      CT head non-acute  CTA head and neck non-acute  MRI brain:  No acute intracranial abnormality, however diffusion imaging was limited by   susceptibility artifact. ECHO:  Summary   Left ventricular systolic function is normal.   Ejection fraction is visually estimated at 55%.   Mild concentric left ventricular hypertrophy.   No regional wall motion abnormalities were detected.   Essentially normal chamber sizes.   No significant valvular disease noted.   No evidence of any pericardial effusion. ASSESSMENT/PLAN:     3 64year old female with acute dizziness and left facial numbness secondary to complicated migraine v. Infarction v. TME superimposed on LIVIER, dehydration with hypokalemia. I do believe she has a left sided Bell's Palsy as well-however already treated with steroids at home. We will continue with the following plan of care:  1. Neuro Exam:  1. Left facial paralysis   2. Left facial decreased pinprick  2. Neurodiagnostics:  1. CT head non-acute  2. CTA non-acute  3. MRI non-acute  4. ECHO as nove  5. Orthostatics pending   3. Medications:  1. ASA/Statin   2. Depacon, compazine, benadryl, Toradol, compazine for acute HA    3. Restart Gabapentin 100mg TID for headache prevention   4. PT/OT/ST:  1. Per their recommendations   5. Follow up:   1. Stable for discharge once her headache is resolved, do believe we need further GI work up, needs hydration per orthostatic, we will complete sleep study in our office and continue HA monitoring with preventative medications. Thank you for allowing us to participate in the care of your patient. If there are any questions regarding evaluation please feel free to contact us.      KARTIK Duque - CNP, 5/10/2019      ------------------------------------

## 2019-05-10 NOTE — PROGRESS NOTES
Nephrology Progress Note        [address], [address] [place] [address]  Phone: [phone]  Office Hours: 8:30AM - 4:30PM  Monday - Friday       [facility]  MD Ilsa Becker, DO [address],  [address]  [place] [address]  PHONE: [phone]  FAX: [phone]    5/10/2019 7:46 AM  Subjective:   Admit Date: 5/8/2019  PCP: Izaiah Randle MD  Interval History:   Doing well   On room air  Reports making urine  Receiving valproic acid this am    Diet: DIET RENAL;      Data:   Scheduled Meds:   lactulose  20 g Oral TID    pneumococcal 13-valent conjugate  0.5 mL Intramuscular Once    baclofen  10 mg Oral TID    levothyroxine  125 mcg Oral Daily    sodium chloride flush  10 mL Intravenous 2 times per day    aspirin  81 mg Oral Daily    sucralfate  1 g Oral BID    famotidine  10 mg Oral BID    atorvastatin  40 mg Oral Nightly    heparin (porcine)  5,000 Units Subcutaneous 3 times per day     Continuous Infusions:  PRN Meds:traMADol **OR** traMADol, dicyclomine, sodium chloride flush, magnesium hydroxide, ondansetron, potassium chloride **OR** potassium alternative oral replacement **OR** potassium chloride  No intake/output data recorded. No intake/output data recorded. No intake or output data in the 24 hours ending 05/10/19 0746    CBC:   Recent Labs     05/08/19  1738 05/09/19  0318   WBC 12.9* 9.3   HGB 13.1 11.3*    185       BMP:    Recent Labs     05/08/19  1738 05/08/19  1740 05/09/19  0318     --  141   K 2.8  K CALLED TO DR FUNES AT 1821 ON [id number] BY [facility]   RESULTS READ BACK  *  --  3.5   CL 97*  --  103   CO2 24  --  27   BUN 40*  --  35*   CREATININE 2.4*  --  1.9*   GLUCOSE 138* 132 88     Hepatic:   Recent Labs     05/08/19  1738   AST 18   ALT 26   BILITOT 0.5   ALKPHOS 92     Troponin: No results for input(s): TROPONINI in the last 72 hours.   BNP: No results for input(s): BNP in the last 72 hours.   Lipids:   Recent Labs     05/09/19  0318   CHOL 163   HDL 36*     ABGs: No results found for: PHART, PO2ART, CUZ6NVB  INR:   Recent Labs     05/08/19  1738   INR 0.98       Objective:   Vitals: /71   Pulse 67   Temp 97.7 °F (36.5 °C) (Oral)   Resp 14   Ht 5' 7\" (1.702 m)   Wt 281 lb 14.4 oz (127.9 kg)   SpO2 100%   BMI 44.15 kg/m²   General appearance: alert and cooperative with exam, in no acute distress  HEENT: normocephalic, atraumatic,   Neck: supple, trachea midline  Lungs:  breathing comfortably on room air  Heart[de-identified] regular rate and rhythm,   Abdomen: soft, non-tender; non distended,   Extremities: extremities atraumatic, no cyanosis or edema  Neurologic: alert, oriented, follows commands, interactive    Assessment and Plan:     - LIVIER from volume depletion, cr 2.4 on admission, baseline cr 1; renal US showed no HN, UA with 1rbcs and 3wbcs//renal US shows no HN and there is normal echogenicity// has not been on etodolac since January 2019  - Hypokalemia: better  - Lightheadedness: mri head unremarkable  - RA     Plan  Awaiting am bmp for further steps  Continue holding BP meds                        Electronically signed by Carol Colon DO on 5/10/2019 at 7:46 AM    [facility]  MD Jesse Rodriguez DO  [address]  [place] [address]  PHONE: [phone]  FAX: [phone]

## 2019-05-11 ENCOUNTER — APPOINTMENT (OUTPATIENT)
Dept: CT IMAGING | Age: 59
DRG: 092 | End: 2019-05-11
Payer: COMMERCIAL

## 2019-05-11 PROBLEM — R19.7 DIARRHEA OF PRESUMED INFECTIOUS ORIGIN: Status: ACTIVE | Noted: 2019-05-11

## 2019-05-11 PROBLEM — R10.9 ABDOMINAL PAIN: Status: ACTIVE | Noted: 2019-05-11

## 2019-05-11 LAB
ANION GAP SERPL CALCULATED.3IONS-SCNC: 11 MMOL/L (ref 4–16)
BUN BLDV-MCNC: 27 MG/DL (ref 6–23)
CALCIUM SERPL-MCNC: 8.8 MG/DL (ref 8.3–10.6)
CHLORIDE BLD-SCNC: 96 MMOL/L (ref 99–110)
CO2: 25 MMOL/L (ref 21–32)
CREAT SERPL-MCNC: 1.4 MG/DL (ref 0.6–1.1)
GFR AFRICAN AMERICAN: 47 ML/MIN/1.73M2
GFR NON-AFRICAN AMERICAN: 39 ML/MIN/1.73M2
GLUCOSE BLD-MCNC: 95 MG/DL (ref 70–99)
HCT VFR BLD CALC: 37.7 % (ref 37–47)
HEMOGLOBIN: 11.9 GM/DL (ref 12.5–16)
MCH RBC QN AUTO: 31.3 PG (ref 27–31)
MCHC RBC AUTO-ENTMCNC: 31.6 % (ref 32–36)
MCV RBC AUTO: 99.2 FL (ref 78–100)
PDW BLD-RTO: 14 % (ref 11.7–14.9)
PLATELET # BLD: 211 K/CU MM (ref 140–440)
PMV BLD AUTO: 10.5 FL (ref 7.5–11.1)
POTASSIUM SERPL-SCNC: 4.2 MMOL/L (ref 3.5–5.1)
RBC # BLD: 3.8 M/CU MM (ref 4.2–5.4)
SODIUM BLD-SCNC: 132 MMOL/L (ref 135–145)
WBC # BLD: 9.6 K/CU MM (ref 4–10.5)

## 2019-05-11 PROCEDURE — G0378 HOSPITAL OBSERVATION PER HR: HCPCS

## 2019-05-11 PROCEDURE — 2580000003 HC RX 258: Performed by: NURSE PRACTITIONER

## 2019-05-11 PROCEDURE — 36415 COLL VENOUS BLD VENIPUNCTURE: CPT

## 2019-05-11 PROCEDURE — 6370000000 HC RX 637 (ALT 250 FOR IP): Performed by: NURSE PRACTITIONER

## 2019-05-11 PROCEDURE — 6360000002 HC RX W HCPCS: Performed by: INTERNAL MEDICINE

## 2019-05-11 PROCEDURE — 97110 THERAPEUTIC EXERCISES: CPT

## 2019-05-11 PROCEDURE — 96366 THER/PROPH/DIAG IV INF ADDON: CPT

## 2019-05-11 PROCEDURE — 99233 SBSQ HOSP IP/OBS HIGH 50: CPT | Performed by: NURSE PRACTITIONER

## 2019-05-11 PROCEDURE — 74176 CT ABD & PELVIS W/O CONTRAST: CPT

## 2019-05-11 PROCEDURE — 94761 N-INVAS EAR/PLS OXIMETRY MLT: CPT

## 2019-05-11 PROCEDURE — 80048 BASIC METABOLIC PNL TOTAL CA: CPT

## 2019-05-11 PROCEDURE — 96372 THER/PROPH/DIAG INJ SC/IM: CPT

## 2019-05-11 PROCEDURE — 99254 IP/OBS CNSLTJ NEW/EST MOD 60: CPT | Performed by: INTERNAL MEDICINE

## 2019-05-11 PROCEDURE — 2700000000 HC OXYGEN THERAPY PER DAY

## 2019-05-11 PROCEDURE — 6360000002 HC RX W HCPCS: Performed by: NURSE PRACTITIONER

## 2019-05-11 PROCEDURE — 2500000003 HC RX 250 WO HCPCS: Performed by: NURSE PRACTITIONER

## 2019-05-11 PROCEDURE — 1200000000 HC SEMI PRIVATE

## 2019-05-11 PROCEDURE — 97116 GAIT TRAINING THERAPY: CPT

## 2019-05-11 PROCEDURE — 85027 COMPLETE CBC AUTOMATED: CPT

## 2019-05-11 RX ORDER — FAMOTIDINE 20 MG/1
20 TABLET, FILM COATED ORAL 2 TIMES DAILY
Status: DISCONTINUED | OUTPATIENT
Start: 2019-05-11 | End: 2019-05-14 | Stop reason: HOSPADM

## 2019-05-11 RX ORDER — POTASSIUM CHLORIDE AND SODIUM CHLORIDE 450; 150 MG/100ML; MG/100ML
INJECTION, SOLUTION INTRAVENOUS CONTINUOUS
Status: DISCONTINUED | OUTPATIENT
Start: 2019-05-11 | End: 2019-05-13 | Stop reason: HOSPADM

## 2019-05-11 RX ADMIN — ATORVASTATIN CALCIUM 40 MG: 40 TABLET, FILM COATED ORAL at 20:15

## 2019-05-11 RX ADMIN — BACLOFEN 10 MG: 10 TABLET ORAL at 14:44

## 2019-05-11 RX ADMIN — DICYCLOMINE HYDROCHLORIDE 20 MG: 20 TABLET ORAL at 11:48

## 2019-05-11 RX ADMIN — HEPARIN SODIUM 5000 UNITS: 5000 INJECTION, SOLUTION INTRAVENOUS; SUBCUTANEOUS at 14:44

## 2019-05-11 RX ADMIN — POTASSIUM CHLORIDE AND SODIUM CHLORIDE: 450; 150 INJECTION, SOLUTION INTRAVENOUS at 20:10

## 2019-05-11 RX ADMIN — GABAPENTIN 100 MG: 100 CAPSULE ORAL at 14:44

## 2019-05-11 RX ADMIN — ASPIRIN 81 MG: 81 TABLET, COATED ORAL at 09:20

## 2019-05-11 RX ADMIN — SODIUM CHLORIDE, PRESERVATIVE FREE 10 ML: 5 INJECTION INTRAVENOUS at 09:20

## 2019-05-11 RX ADMIN — FAMOTIDINE 10 MG: 20 TABLET ORAL at 09:20

## 2019-05-11 RX ADMIN — VALPROATE SODIUM 500 MG: 100 INJECTION, SOLUTION INTRAVENOUS at 03:52

## 2019-05-11 RX ADMIN — Medication: at 04:15

## 2019-05-11 RX ADMIN — BACLOFEN 10 MG: 10 TABLET ORAL at 09:19

## 2019-05-11 RX ADMIN — SUCRALFATE 1 G: 1 TABLET ORAL at 20:11

## 2019-05-11 RX ADMIN — HEPARIN SODIUM 5000 UNITS: 5000 INJECTION, SOLUTION INTRAVENOUS; SUBCUTANEOUS at 20:13

## 2019-05-11 RX ADMIN — HEPARIN SODIUM 5000 UNITS: 5000 INJECTION, SOLUTION INTRAVENOUS; SUBCUTANEOUS at 05:40

## 2019-05-11 RX ADMIN — FAMOTIDINE 20 MG: 20 TABLET ORAL at 20:13

## 2019-05-11 RX ADMIN — SUCRALFATE 1 G: 1 TABLET ORAL at 09:19

## 2019-05-11 RX ADMIN — LEVOTHYROXINE SODIUM 125 MCG: 0.12 TABLET ORAL at 05:40

## 2019-05-11 RX ADMIN — DICYCLOMINE HYDROCHLORIDE 20 MG: 20 TABLET ORAL at 20:12

## 2019-05-11 RX ADMIN — GABAPENTIN 100 MG: 100 CAPSULE ORAL at 09:20

## 2019-05-11 RX ADMIN — BACLOFEN 10 MG: 10 TABLET ORAL at 20:11

## 2019-05-11 RX ADMIN — GABAPENTIN 100 MG: 100 CAPSULE ORAL at 20:12

## 2019-05-11 RX ADMIN — POTASSIUM CHLORIDE AND SODIUM CHLORIDE: 450; 150 INJECTION, SOLUTION INTRAVENOUS at 11:48

## 2019-05-11 RX ADMIN — ONDANSETRON 4 MG: 2 INJECTION INTRAMUSCULAR; INTRAVENOUS at 18:53

## 2019-05-11 ASSESSMENT — PAIN DESCRIPTION - DESCRIPTORS: DESCRIPTORS_2: HEADACHE

## 2019-05-11 ASSESSMENT — PAIN SCALES - GENERAL
PAINLEVEL_OUTOF10: 4
PAINLEVEL_OUTOF10: 6
PAINLEVEL_OUTOF10: 2

## 2019-05-11 ASSESSMENT — PAIN DESCRIPTION - LOCATION
LOCATION: ABDOMEN
LOCATION_2: HEAD
LOCATION: ABDOMEN

## 2019-05-11 ASSESSMENT — PAIN DESCRIPTION - PAIN TYPE
TYPE: ACUTE PAIN
TYPE: ACUTE PAIN
TYPE_2: ACUTE PAIN

## 2019-05-11 ASSESSMENT — PAIN DESCRIPTION - INTENSITY: RATING_2: 3

## 2019-05-11 NOTE — CONSULTS
[address], [address] [place] [address]                                  CONSULTATION    PATIENT NAME: Bibiana Burch                    :        1960  MED REC NO:   [id number]                          ROOM:       [id number]  ACCOUNT NO:   [de-identified]                           ADMIT DATE: 2019  PROVIDER:     Brooklyn Gibson MD    CONSULT DATE:  2019    REFERRING PHYSICIAN:  Laura Hester MD, hospitalist.    PRIMARY GASTROENTEROLOGIST:  Dr. Woo Britt. REASON FOR CONSULTATION:  Upper abdominal pain and chronic diarrhea. HISTORY OF PRESENT ILLNESS:  The patient is Dr. Jyothi Vargas patient. Dr. Woo Britt is off today; therefore, I cover for him. Dr. Woo Britt will return  on Monday and resume her care. A 61-year-old  female patient who has a past medical history of  cholecystectomy, gastritis, peptic ulcer disease, syncope, thyroid  disease, hypothyroidism, kidney stone, hypertension, migraine, anxiety,  arthritis, histoplasmosis infection, was admitted to our hospital  yesterday because of concerning stroke and weakness. She also had acute  renal insufficiency, which is likely due to dehydration based on the  nephrologist's consultation note. The patient reports that she did have  chronic diarrhea for seven to eight years. Normally, she had  intermittent diarrhea two or three days per week followed by a formed  stool and normal bowel movement. However, over the past 10 weeks, after  she took antibiotics in the hospital in 2019, she had worsening  diarrhea. She had seven to eight bowel movements per day, stool was  watery. She denied hematochezia or melena. Also, over the past 10  weeks, she did have chronic upper abdominal pain, which could be  aggravated by eating. Having bowel movement did not relieve the  abdominal pain. The pain occasionally radiated to her back. The pain  was 5/10 in intensity.   The patient denied nausea, vomiting, heartburn,  regurgitation, dysphagia, or odynophagia. The patient denied fever,  chills, weight loss, or weight gain. The patient denied taking NSAID  medication. In 03/2019, the patient had been admitted to the hospital  because of similar upper abdominal pain and CT of the abdomen with IV  contrast did not show any abnormality in the liver, pancreas, or spleen. Gastrointestinal tract was unremarkable based on the CT of abdomen and  pelvis with IV contrast.  The patient also had undergone an upper  endoscopy for upper abdominal pain, which showed gastritis and the  biopsies of the stomach did not show H. pylori infection and biopsies of  the duodenum did not show celiac disease. The patient also reports that  she did have colonoscopy last month in Dr. Naomi Durant office. However,  the official report of her colonoscopy was not available to me. The  patient denied colon polyps. ASSESSMENT AND PLAN:  A 77-year-old obese  female patient who  has past medical history of cholecystectomy, gastritis, peptic ulcer  disease, hypothyroidism, arthritis, anxiety, acid reflux, histoplasmosis  infection, presented with upper abdominal pain and worsening diarrhea  over the past 10 weeks. She denied weight loss or GI bleeding. 1.  Chronic diarrhea that had been getting worse over the past 10 weeks  after she took antibiotics when she was in the hospital 03/2019 with  concern of C. diff colitis. I would check the patient's stool study to  rule out chronic infectious diarrhea including C. diff colitis. If her  stool study was unremarkable, the patient might need another colonoscopy  with random biopsies to rule out microscopic ascites. In addition, her  diarrhea might be related to gallbladder surgery such as bile  acid-related diarrhea. However, I would like to rule out C. diff  colitis and chronic infectious diarrhea first before the patient starts  cholestyramine.   2.  Upper

## 2019-05-11 NOTE — PROGRESS NOTES
(LIORESAL) 10 MG tablet, Take 10 mg by mouth 3 times daily  dicyclomine (BENTYL) 20 MG tablet, Take 1 tablet by mouth 3 times daily as needed (stomach spasms)  levothyroxine (SYNTHROID) 125 MCG tablet, Take 1 tablet by mouth daily  methotrexate (RHEUMATREX) 2.5 MG chemo tablet, Take 12.5 mg by mouth once a week  predniSONE (DELTASONE) 10 MG tablet, Take 10 mg by mouth daily Indications: unsure of dose     Current Medications  Current Facility-Administered Medications   Medication Dose Route Frequency Provider Last Rate Last Dose    0.45 % NaCl with KCl 20 mEq infusion   Intravenous Continuous Ronald Qiu MD 75 mL/hr at 05/11/19 1148      gabapentin (NEURONTIN) capsule 100 mg  100 mg Oral TID Nhi Martinez APRN - CNP   100 mg at 05/11/19 1444    baclofen (LIORESAL) tablet 10 mg  10 mg Oral TID Rosa Pew, APRN - CNP   10 mg at 05/11/19 1444    dicyclomine (BENTYL) tablet 20 mg  20 mg Oral TID PRN Rosa Pew, APRN - CNP   20 mg at 05/11/19 1148    levothyroxine (SYNTHROID) tablet 125 mcg  125 mcg Oral Daily Honey Ryan, APRN - CNP   125 mcg at 05/11/19 0540    sodium chloride flush 0.9 % injection 10 mL  10 mL Intravenous 2 times per day Rosa Pew, APRN - CNP   10 mL at 05/11/19 0920    sodium chloride flush 0.9 % injection 10 mL  10 mL Intravenous PRN Rosa Serranow, APRN - CNP        ondansetron (ZOFRAN) injection 4 mg  4 mg Intravenous Q6H PRN Rosa Pew, APRN - CNP        aspirin EC tablet 81 mg  81 mg Oral Daily Honey Ryan, APRN - CNP   81 mg at 05/11/19 0920    sucralfate (CARAFATE) tablet 1 g  1 g Oral BID Rosa Pew, APRN - CNP   1 g at 05/11/19 0919    famotidine (PEPCID) tablet 10 mg  10 mg Oral BID Rosa Pew, APRN - CNP   10 mg at 05/11/19 0920    atorvastatin (LIPITOR) tablet 40 mg  40 mg Oral Nightly Honey Ryan, APRN - CNP   40 mg at 05/10/19 2118    heparin (porcine) injection 5,000 Units  5,000 Units Subcutaneous 3 times per day Rosa Lau, APRN - CNP   5,000 Units at 05/11/19 1444    potassium chloride (KLOR-CON M) extended release tablet 40 mEq  40 mEq Oral PRN Devonda Purl, APRN - CNP        Or    potassium chloride (KLOR-CON) packet 40 mEq  40 mEq Oral PRN Devonda Purl, APRN - CNP        Or    potassium chloride 10 mEq/100 mL IVPB (Peripheral Line)  10 mEq Intravenous PRN Devonda Purl, APRN - CNP             Allergies  Allergies   Allergen Reactions    Other Anaphylaxis     allergic to cilantro \"trouble breathing\"    Pcn [Penicillins] Nausea Only    Pcn [Penicillins] Nausea And Vomiting       REVIEW OF SYSTEMS     Within above limitations. 14 point review of systems reviewed. Pertinent positive or negative as per HPI or otherwise negative per 14 point systems review. Reviewed 5/11/2019 at 5:22 PM    PHYSICAL EXAM       Blood pressure 134/82, pulse 67, temperature 97.2 °F (36.2 °C), temperature source Oral, resp. rate 14, height 5' 7\" (1.702 m), weight 281 lb 14.4 oz (127.9 kg), SpO2 97 %. General - AAO x 3  Psych - Appropriate affect/speech. No agitation  Eyes - Eye lids intact. No scleral icterus  ENT - Lips wnl. External ear clear/dry/intact. No thyromegaly on inspection  Neuro - No gross peripheral or central neuro deficits on inspection  Heart - Sinus. RRR. S1 and S2 present. No added HS/murmurs appreciated. No elevated JVD appreciated  Lung - Adequate air entry b/l, No crackles/wheezes appreciated  GI -  Soft. No guarding/rigidity. No hepatosplenomegaly/ascites. BS+  Skin - Intact. No rash/petechiae/ecchymosis. Warm extremities  MSK - Joints with normal ROM.  No joint swellings      Lines/Drains/Airways/Wounds:  [unfilled]    LABS AND IMAGING   CBC  [unfilled]    Last 3 Hemoglobin  Lab Results   Component Value Date    HGB 11.3 05/09/2019    HGB 13.1 05/08/2019    HGB 11.3 03/18/2019     Last 3 WBC/ANC  Lab Results   Component Value Date    WBC 9.3 05/09/2019    WBC 12.9 05/08/2019    WBC 6.0 03/18/2019     No components found for: GRNLOCTYABS  Last 3 Platelets  No results found for: PLATELET  Chemistry  [unfilled]  [unfilled]  Lab Results   Component Value Date     09/18/2013     Coagulation Studies  Lab Results   Component Value Date    INR 0.98 05/08/2019     Liver Function Studies  Lab Results   Component Value Date    ALT 26 05/08/2019    AST 18 05/08/2019    ALKPHOS 92 05/08/2019       Recent Imaging        Relevant labs and imaging reviewed    ASSESSMENT AND PLAN     TIA versus presyncope  CT head as well as CTA negative  Follow-up MRI does not show any acute intracranial pathology  Patient symptoms concerning for migraine    Dehydration  Most likely from chronic diarrhea  GI consulted  Patient to get a CT abdomen  Patient to get workup for chronic diarrhea including C. diff  Continue IV fluid resuscitation  Continue sucralfate    Acute kidney injury  From dehydration  Continue IV fluid resuscitation  Renal function improving    Hypertension  Patient currently hypotensive most likely from intravascular contraction  Hold antihypertensives  patient advised to hold antihypertensives at home for systolic blood pressure lower than 140 until resolution of the diarrhea    Dyslipidemia  Continue atorvastatin          [facility], Internal Medicine  5/11/2019 at 5:22 PM

## 2019-05-11 NOTE — PROGRESS NOTES
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 Spouse is very busy w/business and grandchildren work F/T jobs so pt has to be indep in caring for self. Short term goals  Time Frame for Short term goals: 1 week   Short term goal 1: Pt will perform sit><supine indep   Short term goal 2: Pt will transfer with [facility]   Short term goal 3: Pt will ambulate 150ft with [facility]   Short term goal 4: Pt will ascend/descend 6 steps, single rail SBA    Electronically signed by:     Edward Sheppard PTA  5/11/2019, 1:14 PM

## 2019-05-11 NOTE — PROGRESS NOTES
Neurology Service Progress Note  [address] [facility]   Patient Name: Adeel Long  : 1960        Subjective:   Patient seen and examined   States she is groggy this morning with blurred vision and believes it is from [address], we discuss that this is not the standard of care any further with headaches, she agrees she does not want to take Fioricet or Tramadol. She does appreciate an improvement in her numbness and that her head pain has resolved. Denies CP and SOB   :     Medications:  Scheduled Meds:   gabapentin  100 mg Oral TID    baclofen  10 mg Oral TID    levothyroxine  125 mcg Oral Daily    sodium chloride flush  10 mL Intravenous 2 times per day    aspirin  81 mg Oral Daily    sucralfate  1 g Oral BID    famotidine  10 mg Oral BID    atorvastatin  40 mg Oral Nightly    heparin (porcine)  5,000 Units Subcutaneous 3 times per day     Continuous Infusions:    PRN Meds:.dicyclomine, sodium chloride flush, ondansetron, potassium chloride **OR** potassium alternative oral replacement **OR** potassium chloride       Review of Symptoms:    10-point system review completed. All of which are negative except as mentioned above. Physical Exam:         Gen: A&O x 4, NAD, cooperative  HEENT: NC/AT, EOMI, PERRL, mmm, no carotid bruits, neck supple, no meningeal signs; Heart: Regular   Lungs: no distress  Ext: no edema, no calf tenderness b/l  Psych: normal mood and affect  Skin: no rashes or lesions    NEUROLOGIC EXAM:    Mental Status: A&O to self, location, month and year, NAD, speech clear, language fluent, repetition and naming intact, follows commands appropriately    Cranial Nerve Exam:   CN II-XII: , PERRL, VFF, no nystagmus, no gaze paresis, sensation V1-V3 intact b/l, muscles of facial expression asymmetric with a left facial paralysis; hearing intact to conversational tone, palate elevates symmetrically, shoulder elevation symmetric and tongue protrudes midline with movement side to side.     Motor ventricular hypertrophy.   No regional wall motion abnormalities were detected.   Essentially normal chamber sizes.   No significant valvular disease noted.   No evidence of any pericardial effusion. ASSESSMENT/PLAN:     3 64year old female with acute dizziness and left facial numbness now resolved secondary to complicated migraine & TME superimposed on LIVIER, dehydration with hypokalemia. I do believe she has a left sided Bell's Palsy as well-however already treated with steroids at home. We will continue with the following plan of care:  1. Neuro Exam:  1. Left facial paralysis   2. Neurodiagnostics:  1. CT head non-acute  2. CTA non-acute  3. MRI non-acute  4. ECHO as nove  3. Medications:  1. ASA/Statin   2. Depacon, compazine, benadryl, Toradol, compazine for acute HA    3. Restart Gabapentin 100mg TID for headache prevention   4. D/C Fioricet and Tramadol   4. PT/OT/ST:  1. Per their recommendations   5. Follow up:   1. Stable for discharge once her headache is resolved, do believe we need further GI work up, needs hydration per orthostatic, we will complete sleep study in our office and continue HA monitoring with preventative medications. Thank you for allowing us to participate in the care of your patient. If there are any questions regarding evaluation please feel free to contact us.      KARTIK Lou - CNP, 2019      ------------------------------------

## 2019-05-11 NOTE — CONSULTS
CONSULTATION     PATIENT NAME: Chirag Mondragon                    :        1960  MED REC NO:   [id number]                          ROOM:       3011  ACCOUNT NO:   [de-identified]                           ADMIT DATE: 2019  PROVIDER:     Tamia Nathan MD     CONSULT DATE:  2019     REFERRING PHYSICIAN:  Celi Ch MD, hospitalist.     PRIMARY GASTROENTEROLOGIST:  Dr. Denny Caceres:  Upper abdominal pain and chronic diarrhea.     HISTORY OF PRESENT ILLNESS:  The patient is Dr. Amina Santana patient. Dr. Mark Leary is off today; therefore, I cover for him. Dr. Mark Leary will return on Monday and resume her care.     A 53-year-old  female patient who has a past medical history of cholecystectomy, gastritis, peptic ulcer disease, syncope, thyroid disease, hypothyroidism, kidney stone, hypertension, migraine, anxiety, arthritis, histoplasmosis infection, was admitted to our hospital yesterday because of concerning stroke and weakness. She also had acute renal insufficiency, which is likely due to dehydration based on the nephrologist's consultation note. The patient reports that she did have chronic diarrhea for seven to eight years. Normally, she had intermittent diarrhea two or three days per week followed by a formed stool and normal bowel movement. However, over the past 10 weeks, after she took antibiotics in the hospital in 2019, she had worsening diarrhea. She had seven to eight bowel movements per day, stool was watery. She denied hematochezia or melena. Also, over the past 10 weeks, she did have chronic upper abdominal pain, which could be aggravated by eating. Having bowel movement did not relieve the abdominal pain. The pain occasionally radiated to her back. The pain was 5/10 in intensity. The patient denied nausea, vomiting, heartburn, regurgitation, dysphagia, or odynophagia.   The patient denied fever, chills, weight loss, or weight gain. The patient denied taking NSAID medication. In 03/2019, the patient had been admitted to the hospital because of similar upper abdominal pain and CT of the abdomen with IV contrast did not show any abnormality in the liver, pancreas, or spleen. Gastrointestinal tract was unremarkable based on the CT of abdomen and pelvis with IV contrast.  The patient also had undergone an upper endoscopy for upper abdominal pain, which showed gastritis and the biopsies of the stomach did not show H. pylori infection and biopsies of the duodenum did not show celiac disease. The patient also reports that she did have colonoscopy last month in Dr. Nish Grey office. However,  the official report of her colonoscopy was not available to me. The patient denied having colon polyps. PMH:    Past Medical History:   Diagnosis Date    Anxiety     \"work related\"    Arthritis     Chest pain     Fatigue     Fatigue     \"increased fatigue, random fever, headaches- doing lumbar puncture to check this\"(scheduled 9/11/2013)    Generalized anxiety disorder     GERD (gastroesophageal reflux disease)     Occasionally    H/O cardiovascular stress test 1/13/2014    cardiolite-no ischemia, EF 70%,normal    H/O CT scan of chest 12/23/13    Right upper lobe pulmonary nodule continues to shrink in size. Mediastinal and hilar adenopathy  are present but also significant improved.      Histoplasmosis     History of cardiac monitoring 1/13/14    Event - NSR    History of echocardiogram 1/13/2014    EF 55% mild TR    History of histoplasmosis     History of hysterectomy     History of kidney stones     \"8 yrs ago- passed them on my own\"    History of lymph node biopsy     Hypertension     Hypothyroidism     Kidney stones     Migraines     \"have had a constant migraine for the past 6 weeks, gets worse at times but never goes away\"    Mild tricuspid regurgitation 1/13/2014    Peptic ulcer     Peptic ulcer     As a teenager.  Shortness of breath     SOB (shortness of breath)     Syncope and collapse     Thyroid disease        PSH:    Past Surgical History:   Procedure Laterality Date    APPENDECTOMY      APPENDECTOMY  1979(pc)    CHOLECYSTECTOMY      CHOLECYSTECTOMY  2008(pc)    Lap Choley    COLONOSCOPY  2008    DILATION AND CURETTAGE OF UTERUS  2008    ENDOSCOPY, COLON, DIAGNOSTIC  2008    HYSTERECTOMY  2008    per old chart pt had exp. laparotomy with lysis of adhesions and JACK/BSO done 2008(pc)    KNEE SURGERY Left 11/2012    \"ACL surgery with a scope\"    MEDIASTINOSCOPY  11/05/2013    OTHER SURGICAL HISTORY  11 05 2013    medianstinoscopy    JACK AND BSO      UPPER GASTROINTESTINAL ENDOSCOPY N/A 3/19/2019    EGD BIOPSY performed by Maylin Horton MD at [facility] ENDOSCOPY       Medications:    No current facility-administered medications on file prior to encounter. Current Outpatient Medications on File Prior to Encounter   Medication Sig Dispense Refill    amLODIPine (NORVASC) 5 MG tablet Take 5 mg by mouth daily      chlorthalidone (HYGROTEN) 50 MG tablet Take 50 mg by mouth daily      valsartan (DIOVAN) 320 MG tablet Take 320 mg by mouth daily      etodolac (LODINE) 500 MG tablet Take 500 mg by mouth 2 times daily      baclofen (LIORESAL) 10 MG tablet Take 10 mg by mouth 3 times daily      dicyclomine (BENTYL) 20 MG tablet Take 1 tablet by mouth 3 times daily as needed (stomach spasms) 120 tablet 3    levothyroxine (SYNTHROID) 125 MCG tablet Take 1 tablet by mouth daily 90 tablet 1    methotrexate (RHEUMATREX) 2.5 MG chemo tablet Take 12.5 mg by mouth once a week      predniSONE (DELTASONE) 10 MG tablet Take 10 mg by mouth daily Indications: unsure of dose          Allergies:    Allergies   Allergen Reactions    Other Anaphylaxis     allergic to cilantro \"trouble breathing\"    Pcn [Penicillins] Nausea Only    Pcn [Penicillins] Nausea And Vomiting Social History:    Social History     Socioeconomic History    Marital status:      Spouse name: Not on file    Number of children: Not on file    Years of education: Not on file    Highest education level: Not on file   Occupational History    Not on file   Social Needs    Financial resource strain: Not on file    Food insecurity:     Worry: Not on file     Inability: Not on file    Transportation needs:     Medical: Not on file     Non-medical: Not on file   Tobacco Use    Smoking status: Never Smoker    Smokeless tobacco: Never Used    Tobacco comment: exposed to heavy 2nd hand smoke x 18 yrs per parents   Substance and Sexual Activity    Alcohol use: Yes     Comment: glass of wine one time per month at the most     CAFFEINE: 1 cup coffee or 1 soda daily.     Drug use: No    Sexual activity: Yes     Partners: Male     Comment:    Lifestyle    Physical activity:     Days per week: Not on file     Minutes per session: Not on file    Stress: Not on file   Relationships    Social connections:     Talks on phone: Not on file     Gets together: Not on file     Attends Latter day service: Not on file     Active member of club or organization: Not on file     Attends meetings of clubs or organizations: Not on file     Relationship status: Not on file    Intimate partner violence:     Fear of current or ex partner: Not on file     Emotionally abused: Not on file     Physically abused: Not on file     Forced sexual activity: Not on file   Other Topics Concern    Not on file   Social History Narrative    ** Merged History Encounter **            Family History:        Problem Relation Age of Onset    Other Mother         lung neoplasm, malignant    Cancer Mother         lung ca    Heart Disease Mother     Other Father         lymphoma    Cancer Father         lymphoma    High Blood Pressure Sister     Heart Disease Sister     Stroke Sister         Alexsander Roger Diabetes Sister    Yane Angel Heart Disease Maternal Grandfather          Review of Systems:  Constitutional: No weight loss, no fevers. Eyes: No problems with vision. ENT: No nose or sinus problems, no oral problems, no throat problems or hoarseness. Cardiovascular: No chest pain, no leg pain with walking, no palpitations, no ankle swelling. Respiratory: No shortness of breath, no persistent cough, no wheezing. Endocrine: No increased thirst, no increased urination. Gastrointestinal: No heartburn, no dysphagia, + abdominal pain, no loss of appetite, no nausea or vomiting, + diarrhea, no constipation, no melena, no hematochezia, no sceleral icterus or jaundice. Skin: No rashes. Musculoskeletal: No trouble walking or standing, no joint pain, no muscle pain. Allergy/Immune System: No allergies, no frequent infections. Neurological: No memory difficulties, no temporary blindness, no difficulty speaking, no headaches, no numbness. Psychiatric: No depression, no suicidal ideation, no auditory hallucinations. Hematological/Lymphatic: No lymphadenopathy, no frequent nose bleeds, no easy bruising. Genitourinary: No penile/vaginal discharge, no pain with urination, no trouble starting urinary stream, no hematuria. Physical Examination  Vital Signs: /78   Pulse 62   Temp 97.6 °F (36.4 °C) (Oral)   Resp 15   Ht 5' 7\" (1.702 m)   Wt 281 lb 14.4 oz (127.9 kg)   SpO2 97%   BMI 44.15 kg/m²  Body mass index is 44.15 kg/m². General: The patient is a 64 y.o. female in No acute distress. EYE: EOMI, Gross visual field was normal. Pupils reactive, The conjunctive was normal, with no erythema. ENT: no lymphadenopathy, oropharynx is without erythema, edema, or exudates, and moist mucus membranes. The nasal mucosa, septum and turbinates were normal without inflammation or edema. Neck: There was no mass on palpitation, tracheal position was in the middle of the neck and there was no enlarged thyroid. There was no JVD. Lungs:  The who has past medical history of cholecystectomy, gastritis, peptic ulcer  disease, hypothyroidism, arthritis, anxiety, acid reflux, histoplasmosis infection, presented with upper abdominal pain and worsening diarrhea over the past 10 weeks. She denied weight loss or GI bleeding. 1.  Chronic diarrhea that had been getting worse over the past 10 weeks after she took antibiotics when she was in the hospital 03/2019 with concern of C. diff colitis. I would check the patient's stool study to rule out chronic infectious diarrhea including C. diff colitis. If her stool study was unremarkable, the patient might need another colonoscopy  with random biopsies to rule out microscopic ascites. In addition, her diarrhea might be related to gallbladder surgery such as bile acid-related diarrhea. However, I would like to rule out C. Diff colitis and chronic infectious diarrhea first before the patient starts cholestyramine. 2.  Upper abdominal pain, might be secondary to gastritis, IBS. The last time the patient had a similar abdominal pain in 03/2019, the CT of the abdomen with IV contrast did not show any abnormalities. Today, I will order another CT scan of abdomen and pelvis with no IV contrast because the patient has renal insufficiency to determine whether there  is any change of the CT scan of the intraabdominal organs. 3.  Mild anemia, might be secondary to Crohn's disease or malnutrition or IV fluid. The patient did not have signs of GI bleeding. I would recommend to continue to follow the patient's hemoglobin and hematocrit when the patient is in the hospital.     Dr. Mark Leary would return on Monday and she will resume her care. Tamia Nathan MD PhD [facility]  [address] [facility] [address]  0ffice: [phone]  Fax: [phone]

## 2019-05-11 NOTE — PROGRESS NOTES
Nephrology Progress Note        [address], [address] [address]  Phone: [phone]  Office Hours: 8:30AM - 4:30PM  Monday - Friday       [facility]  MD Solange Zabala, DO [address],  [facility] [address]  PHONE: [phone]  FAX: [phone]    5/11/2019 9:57 AM  Subjective:   Admit Date: 5/8/2019  PCP: Delphine Barakat MD  Interval History:   Multiple bouts of loose BMs  Feels dry  Diet: DIET RENAL;      Data:   Scheduled Meds:   gabapentin  100 mg Oral TID    baclofen  10 mg Oral TID    levothyroxine  125 mcg Oral Daily    sodium chloride flush  10 mL Intravenous 2 times per day    aspirin  81 mg Oral Daily    sucralfate  1 g Oral BID    famotidine  10 mg Oral BID    atorvastatin  40 mg Oral Nightly    heparin (porcine)  5,000 Units Subcutaneous 3 times per day     Continuous Infusions:  PRN Meds:dicyclomine, sodium chloride flush, ondansetron, potassium chloride **OR** potassium alternative oral replacement **OR** potassium chloride  No intake/output data recorded. I/O this shift:  In: 10 [I.V.:10]  Out: -     Intake/Output Summary (Last 24 hours) at 5/11/2019 0957  Last data filed at 5/11/2019 0919  Gross per 24 hour   Intake 10 ml   Output --   Net 10 ml       CBC:   Recent Labs     05/08/19 1738 05/09/19 0318   WBC 12.9* 9.3   HGB 13.1 11.3*    185       BMP:    Recent Labs     05/08/19  1738 05/08/19  1740 05/09/19  0318 05/10/19  1041     --  141 141   K 2.8  K CALLED TO DR FUNES AT 4340 ON [id number] BY [facility]   RESULTS READ BACK  *  --  3.5 3.6   CL 97*  --  103 102   CO2 24  --  27 24   BUN 40*  --  35* 25*   CREATININE 2.4*  --  1.9* 1.4*   GLUCOSE 138* 132 88 85     Hepatic:   Recent Labs     05/08/19  1738   AST 18   ALT 26   BILITOT 0.5   ALKPHOS 92     Troponin: No results for input(s): TROPONINI in the last 72 hours.   BNP: No results for input(s): BNP in the last 72

## 2019-05-12 ENCOUNTER — APPOINTMENT (OUTPATIENT)
Dept: GENERAL RADIOLOGY | Age: 59
DRG: 092 | End: 2019-05-12
Payer: COMMERCIAL

## 2019-05-12 LAB
ANION GAP SERPL CALCULATED.3IONS-SCNC: 11 MMOL/L (ref 4–16)
BUN BLDV-MCNC: 24 MG/DL (ref 6–23)
CALCIUM SERPL-MCNC: 8.3 MG/DL (ref 8.3–10.6)
CHLORIDE BLD-SCNC: 100 MMOL/L (ref 99–110)
CO2: 23 MMOL/L (ref 21–32)
CREAT SERPL-MCNC: 1.3 MG/DL (ref 0.6–1.1)
GFR AFRICAN AMERICAN: 51 ML/MIN/1.73M2
GFR NON-AFRICAN AMERICAN: 42 ML/MIN/1.73M2
GLUCOSE BLD-MCNC: 88 MG/DL (ref 70–99)
HCT VFR BLD CALC: 33.6 % (ref 37–47)
HEMOGLOBIN: 10.6 GM/DL (ref 12.5–16)
MCH RBC QN AUTO: 30.9 PG (ref 27–31)
MCHC RBC AUTO-ENTMCNC: 31.5 % (ref 32–36)
MCV RBC AUTO: 98 FL (ref 78–100)
PDW BLD-RTO: 14.1 % (ref 11.7–14.9)
PLATELET # BLD: 166 K/CU MM (ref 140–440)
PMV BLD AUTO: 10 FL (ref 7.5–11.1)
POTASSIUM SERPL-SCNC: 3.7 MMOL/L (ref 3.5–5.1)
RBC # BLD: 3.43 M/CU MM (ref 4.2–5.4)
SODIUM BLD-SCNC: 134 MMOL/L (ref 135–145)
WBC # BLD: 6.6 K/CU MM (ref 4–10.5)

## 2019-05-12 PROCEDURE — 74022 RADEX COMPL AQT ABD SERIES: CPT

## 2019-05-12 PROCEDURE — 80048 BASIC METABOLIC PNL TOTAL CA: CPT

## 2019-05-12 PROCEDURE — 1200000000 HC SEMI PRIVATE

## 2019-05-12 PROCEDURE — 85027 COMPLETE CBC AUTOMATED: CPT

## 2019-05-12 PROCEDURE — 6360000002 HC RX W HCPCS: Performed by: NURSE PRACTITIONER

## 2019-05-12 PROCEDURE — 2580000003 HC RX 258: Performed by: NURSE PRACTITIONER

## 2019-05-12 PROCEDURE — 99232 SBSQ HOSP IP/OBS MODERATE 35: CPT | Performed by: INTERNAL MEDICINE

## 2019-05-12 PROCEDURE — 6370000000 HC RX 637 (ALT 250 FOR IP): Performed by: NURSE PRACTITIONER

## 2019-05-12 PROCEDURE — 36415 COLL VENOUS BLD VENIPUNCTURE: CPT

## 2019-05-12 PROCEDURE — 6360000002 HC RX W HCPCS: Performed by: INTERNAL MEDICINE

## 2019-05-12 PROCEDURE — 6370000000 HC RX 637 (ALT 250 FOR IP): Performed by: HOSPITALIST

## 2019-05-12 RX ORDER — MECLIZINE HCL 12.5 MG/1
12.5 TABLET ORAL 3 TIMES DAILY PRN
Status: DISCONTINUED | OUTPATIENT
Start: 2019-05-12 | End: 2019-05-14 | Stop reason: HOSPADM

## 2019-05-12 RX ORDER — TRAMADOL HYDROCHLORIDE 50 MG/1
50 TABLET ORAL EVERY 6 HOURS PRN
Status: DISCONTINUED | OUTPATIENT
Start: 2019-05-12 | End: 2019-05-14 | Stop reason: HOSPADM

## 2019-05-12 RX ADMIN — BACLOFEN 10 MG: 10 TABLET ORAL at 13:52

## 2019-05-12 RX ADMIN — HEPARIN SODIUM 5000 UNITS: 5000 INJECTION, SOLUTION INTRAVENOUS; SUBCUTANEOUS at 05:09

## 2019-05-12 RX ADMIN — FAMOTIDINE 20 MG: 20 TABLET ORAL at 20:03

## 2019-05-12 RX ADMIN — FAMOTIDINE 20 MG: 20 TABLET ORAL at 10:02

## 2019-05-12 RX ADMIN — GABAPENTIN 100 MG: 100 CAPSULE ORAL at 13:52

## 2019-05-12 RX ADMIN — SUCRALFATE 1 G: 1 TABLET ORAL at 20:03

## 2019-05-12 RX ADMIN — BACLOFEN 10 MG: 10 TABLET ORAL at 20:03

## 2019-05-12 RX ADMIN — ATORVASTATIN CALCIUM 40 MG: 40 TABLET, FILM COATED ORAL at 20:03

## 2019-05-12 RX ADMIN — TRAMADOL HYDROCHLORIDE 50 MG: 50 TABLET, FILM COATED ORAL at 10:02

## 2019-05-12 RX ADMIN — GABAPENTIN 100 MG: 100 CAPSULE ORAL at 10:02

## 2019-05-12 RX ADMIN — DICYCLOMINE HYDROCHLORIDE 20 MG: 20 TABLET ORAL at 20:12

## 2019-05-12 RX ADMIN — LEVOTHYROXINE SODIUM 125 MCG: 0.12 TABLET ORAL at 05:09

## 2019-05-12 RX ADMIN — SODIUM CHLORIDE, PRESERVATIVE FREE 10 ML: 5 INJECTION INTRAVENOUS at 20:04

## 2019-05-12 RX ADMIN — GABAPENTIN 100 MG: 100 CAPSULE ORAL at 20:03

## 2019-05-12 RX ADMIN — ASPIRIN 81 MG: 81 TABLET, COATED ORAL at 10:02

## 2019-05-12 RX ADMIN — TRAMADOL HYDROCHLORIDE 50 MG: 50 TABLET, FILM COATED ORAL at 17:42

## 2019-05-12 RX ADMIN — BACLOFEN 10 MG: 10 TABLET ORAL at 10:02

## 2019-05-12 RX ADMIN — HEPARIN SODIUM 5000 UNITS: 5000 INJECTION, SOLUTION INTRAVENOUS; SUBCUTANEOUS at 20:03

## 2019-05-12 RX ADMIN — MECLIZINE 12.5 MG: 12.5 TABLET ORAL at 01:53

## 2019-05-12 RX ADMIN — POTASSIUM CHLORIDE AND SODIUM CHLORIDE: 450; 150 INJECTION, SOLUTION INTRAVENOUS at 20:04

## 2019-05-12 RX ADMIN — HEPARIN SODIUM 5000 UNITS: 5000 INJECTION, SOLUTION INTRAVENOUS; SUBCUTANEOUS at 13:52

## 2019-05-12 RX ADMIN — SUCRALFATE 1 G: 1 TABLET ORAL at 10:02

## 2019-05-12 RX ADMIN — POTASSIUM CHLORIDE AND SODIUM CHLORIDE: 450; 150 INJECTION, SOLUTION INTRAVENOUS at 15:21

## 2019-05-12 ASSESSMENT — PAIN SCALES - GENERAL
PAINLEVEL_OUTOF10: 5
PAINLEVEL_OUTOF10: 6

## 2019-05-12 ASSESSMENT — PAIN DESCRIPTION - LOCATION
LOCATION: ABDOMEN
LOCATION: HEAD

## 2019-05-12 ASSESSMENT — PAIN DESCRIPTION - PAIN TYPE
TYPE: ACUTE PAIN
TYPE: ACUTE PAIN

## 2019-05-12 NOTE — PLAN OF CARE
Problem: Falls - Risk of:  Goal: Will remain free from falls  Description  Will remain free from falls  5/12/2019 1112 by Bernice Godfrey LPN  Outcome: Ongoing  5/12/2019 0021 by Sasha Joel RN  Outcome: Ongoing  Goal: Absence of physical injury  Description  Absence of physical injury  5/12/2019 1112 by Bernice Godfrey LPN  Outcome: Ongoing  5/12/2019 0021 by Sasha Joel RN  Outcome: Ongoing     Problem: Pain:  Goal: Pain level will decrease  Description  Pain level will decrease  5/12/2019 1112 by Bernice Godfrey LPN  Outcome: Ongoing  5/12/2019 0021 by Sasha Joel RN  Outcome: Ongoing  Goal: Control of acute pain  Description  Control of acute pain  5/12/2019 1112 by Bernice Godfrey LPN  Outcome: Ongoing  5/12/2019 0021 by Sasha Joel RN  Outcome: Ongoing  Goal: Control of chronic pain  Description  Control of chronic pain  5/12/2019 1112 by Bernice Godfrey LPN  Outcome: Ongoing  5/12/2019 0021 by Sasha Joel RN  Outcome: Ongoing     Problem: Musculor/Skeletal Functional Status  Goal: Highest potential functional level  5/12/2019 1112 by Bernice Godfrey LPN  Outcome: Ongoing  5/12/2019 0021 by Sasha Joel RN  Outcome: Ongoing

## 2019-05-12 NOTE — PROGRESS NOTES
[facility] [address], [address] [place] [address]  Phone: [phone]  Office Hours: 8:30AM - 4:30PM  Monday - Friday     Excellent BP  Creat 1.3  K normal  No added suggestions

## 2019-05-12 NOTE — PROGRESS NOTES
2013    medianstinoscopy    JACK AND BSO      UPPER GASTROINTESTINAL ENDOSCOPY N/A 3/19/2019    EGD BIOPSY performed by Jayne Evans MD at [facility] ENDOSCOPY       Medications:    No current facility-administered medications on file prior to encounter. Current Outpatient Medications on File Prior to Encounter   Medication Sig Dispense Refill    amLODIPine (NORVASC) 5 MG tablet Take 5 mg by mouth daily      chlorthalidone (HYGROTEN) 50 MG tablet Take 50 mg by mouth daily      valsartan (DIOVAN) 320 MG tablet Take 320 mg by mouth daily      etodolac (LODINE) 500 MG tablet Take 500 mg by mouth 2 times daily      baclofen (LIORESAL) 10 MG tablet Take 10 mg by mouth 3 times daily      dicyclomine (BENTYL) 20 MG tablet Take 1 tablet by mouth 3 times daily as needed (stomach spasms) 120 tablet 3    levothyroxine (SYNTHROID) 125 MCG tablet Take 1 tablet by mouth daily 90 tablet 1    methotrexate (RHEUMATREX) 2.5 MG chemo tablet Take 12.5 mg by mouth once a week      predniSONE (DELTASONE) 10 MG tablet Take 10 mg by mouth daily Indications: unsure of dose          Allergies:    Allergies   Allergen Reactions    Other Anaphylaxis     allergic to cilantro \"trouble breathing\"    Pcn [Penicillins] Nausea Only    Pcn [Penicillins] Nausea And Vomiting       Social History:    Social History     Socioeconomic History    Marital status:      Spouse name: Not on file    Number of children: Not on file    Years of education: Not on file    Highest education level: Not on file   Occupational History    Not on file   Social Needs    Financial resource strain: Not on file    Food insecurity:     Worry: Not on file     Inability: Not on file    Transportation needs:     Medical: Not on file     Non-medical: Not on file   Tobacco Use    Smoking status: Never Smoker    Smokeless tobacco: Never Used    Tobacco comment: exposed to heavy 2nd hand smoke x 18 yrs per parents   Substance and Sexual Activity    rashes. Musculoskeletal: No trouble walking or standing, no joint pain, no muscle pain. Allergy/Immune System: No allergies, no frequent infections. Neurological: No memory difficulties, no temporary blindness, no difficulty speaking, no headaches, no numbness. Psychiatric: No depression, no suicidal ideation, no auditory hallucinations. Hematological/Lymphatic: No lymphadenopathy, no frequent nose bleeds, no easy bruising. Genitourinary: No penile/vaginal discharge, no pain with urination, no trouble starting urinary stream, no hematuria. Physical Examination  Vital Signs: /83   Pulse 73   Temp 98 °F (36.7 °C) (Oral)   Resp 14   Ht 5' 7\" (1.702 m)   Wt 281 lb 14.4 oz (127.9 kg)   SpO2 97%   BMI 44.15 kg/m²  Body mass index is 44.15 kg/m². General: The patient is a 64 y.o. female in No acute distress. EYE: EOMI, Gross visual field was normal. Pupils reactive, The conjunctive was normal, with no erythema. ENT: no lymphadenopathy, oropharynx is without erythema, edema, or exudates, and moist mucus membranes. The nasal mucosa, septum and turbinates were normal without inflammation or edema. Neck: There was no mass on palpitation, tracheal position was in the middle of the neck and there was no enlarged thyroid. There was no JVD. Lungs: The respiratory was not in labor and the patient did not use accessory muscle. Clear to auscultation bilaterally, no wheeze/crackles. Cardiovascular: Regular rate and rhythm, normal S1 & S2, no murmurs, rubs or gallops appreciated. Peripheral pulses were normal and no tenderness. Abdomen: Soft, non-tender, no rebound or guarding or peritoneal features, no masses, no hepatosplenomegaly. Extremities: upper and lower extremities were warm and dry, no clubbing, cyanosis, edema. Neuro: CN II-XII were intact grossly. Sensation was normal on all extremities and the muscle strength was normal and symmetry. Rectum:  There was no fistular, fissure, external hemorrhoid, tenderness, abscess, erythema or discharge    Labs:  CBC  WBC   Date Value Ref Range Status   05/12/2019 6.6 4.0 - 10.5 K/CU MM Final     Hemoglobin   Date Value Ref Range Status   05/12/2019 10.6 (L) 12.5 - 16.0 GM/DL Final     Hematocrit   Date Value Ref Range Status   05/12/2019 33.6 (L) 37 - 47 % Final     MCV   Date Value Ref Range Status   05/12/2019 98.0 78 - 100 FL Final        Glucose   Date Value Ref Range Status   05/12/2019 88 70 - 99 MG/DL Final     CO2   Date Value Ref Range Status   05/12/2019 23 21 - 32 MMOL/L Final     BUN   Date Value Ref Range Status   05/12/2019 24 (H) 6 - 23 MG/DL Final     Lab Results   Component Value Date    ALT 26 05/08/2019    AST 18 05/08/2019     No results found for: AMYLASE  No results found for: LIPASE  Lab Results   Component Value Date    ESR 77 02/18/2019     No components found for: CREACTIVEPR  Lab Results   Component Value Date    ERIC None Detected 02/18/2019    No components found for: ANTISMA, ANTIMITO  No results found for: CEA  No components found for: OCCULTBLOOD, OCCBLDSINPOC, OCCULTBLOODS, OCCBLD1, OCCBLD2, OCCBLD3, OCCULTBLOOD  No results found for: IRON, FERRITIN  No results found for: HAV    Assessment     Assessment and Plan:    1. Diarrhea had improved. We still need stool test to r/o C diff. I felt that she might have IBS or bilie-acid related diarrhea or viral gastroenteritis     2. Upper Abd pain improved today. The CT did not show bowel obstruction or bowel wall thickening. It might be related to gastritis or IBS. I suggest symptomatically treating her. Sherron Stout MD PhD [facility]  [address] [facility] [address]  0ffice: [phone]  Fax: [phone]

## 2019-05-12 NOTE — PROGRESS NOTES
mouth daily  valsartan (DIOVAN) 320 MG tablet, Take 320 mg by mouth daily  etodolac (LODINE) 500 MG tablet, Take 500 mg by mouth 2 times daily  baclofen (LIORESAL) 10 MG tablet, Take 10 mg by mouth 3 times daily  dicyclomine (BENTYL) 20 MG tablet, Take 1 tablet by mouth 3 times daily as needed (stomach spasms)  levothyroxine (SYNTHROID) 125 MCG tablet, Take 1 tablet by mouth daily  methotrexate (RHEUMATREX) 2.5 MG chemo tablet, Take 12.5 mg by mouth once a week  predniSONE (DELTASONE) 10 MG tablet, Take 10 mg by mouth daily Indications: unsure of dose     Current Medications  Current Facility-Administered Medications   Medication Dose Route Frequency Provider Last Rate Last Dose    meclizine (ANTIVERT) tablet 12.5 mg  12.5 mg Oral TID PRN Cristina Severino MD   12.5 mg at 05/12/19 0153    traMADol (ULTRAM) tablet 50 mg  50 mg Oral Q6H PRN Eva Gastelum MD   50 mg at 05/12/19 1002    0.45 % NaCl with KCl 20 mEq infusion   Intravenous Continuous Ronald Qiu MD 75 mL/hr at 05/12/19 1521      famotidine (PEPCID) tablet 20 mg  20 mg Oral BID KARTIK Castillo NP   20 mg at 05/12/19 1002    gabapentin (NEURONTIN) capsule 100 mg  100 mg Oral TID KARTIK Duque CNP   100 mg at 05/12/19 1352    baclofen (LIORESAL) tablet 10 mg  10 mg Oral TID KARTIK Cabezas CNP   10 mg at 05/12/19 1352    dicyclomine (BENTYL) tablet 20 mg  20 mg Oral TID PRN KARTIK Cabezas CNP   20 mg at 05/11/19 2012    levothyroxine (SYNTHROID) tablet 125 mcg  125 mcg Oral Daily KARTIK Raman CNP   125 mcg at 05/12/19 0509    sodium chloride flush 0.9 % injection 10 mL  10 mL Intravenous 2 times per day KARTIK Cabezas - CNP   10 mL at 05/11/19 0920    sodium chloride flush 0.9 % injection 10 mL  10 mL Intravenous PRN Rosa Lau APRN - CNP        ondansetron (ZOFRAN) injection 4 mg  4 mg Intravenous Q6H PRN Rosa Lau, APRN - CNP   4 mg at 05/11/19 2300    aspirin EC tablet 81 mg  81 mg Oral Daily Britni Severs, APRN - CNP   81 mg at 05/12/19 1002    sucralfate (CARAFATE) tablet 1 g  1 g Oral BID Britni Severs, APRN - CNP   1 g at 05/12/19 1002    atorvastatin (LIPITOR) tablet 40 mg  40 mg Oral Nightly Britni Severs, APRN - CNP   40 mg at 05/11/19 2015    heparin (porcine) injection 5,000 Units  5,000 Units Subcutaneous 3 times per day Britni Severs, APRN - CNP   5,000 Units at 05/12/19 1352    potassium chloride (KLOR-CON M) extended release tablet 40 mEq  40 mEq Oral PRN Britni Severs, APRN - CNP        Or    potassium chloride (KLOR-CON) packet 40 mEq  40 mEq Oral PRN Britni Severs, APRN - CNP        Or    potassium chloride 10 mEq/100 mL IVPB (Peripheral Line)  10 mEq Intravenous PRN Britni Severs, APRN - CNP             Allergies  Allergies   Allergen Reactions    Other Anaphylaxis     allergic to cilantro \"trouble breathing\"    Pcn [Penicillins] Nausea Only    Pcn [Penicillins] Nausea And Vomiting       REVIEW OF SYSTEMS     Within above limitations. 14 point review of systems reviewed. Pertinent positive or negative as per HPI or otherwise negative per 14 point systems review. Reviewed 5/12/2019 at 3:41 PM    PHYSICAL EXAM       Blood pressure 122/82, pulse 64, temperature 98 °F (36.7 °C), temperature source Oral, resp. rate 19, height 5' 7\" (1.702 m), weight 281 lb 14.4 oz (127.9 kg), SpO2 99 %. General - AAO x 3  Psych - Appropriate affect/speech. No agitation  Eyes - Eye lids intact. No scleral icterus  ENT - Lips wnl. External ear clear/dry/intact. No thyromegaly on inspection  Neuro - No gross peripheral or central neuro deficits on inspection  Heart - Sinus. RRR. S1 and S2 present. No added HS/murmurs appreciated. No elevated JVD appreciated  Lung - Adequate air entry b/l, No crackles/wheezes appreciated  GI -  Soft. No guarding/rigidity. No hepatosplenomegaly/ascites. BS+  Skin - Intact. No rash/petechiae/ecchymosis.  Warm extremities  MSK - Joints with normal ROM. No joint swellings      Lines/Drains/Airways/Wounds:  [unfilled]    LABS AND IMAGING   CBC  [unfilled]    Last 3 Hemoglobin  Lab Results   Component Value Date    HGB 10.6 05/12/2019    HGB 11.9 05/11/2019    HGB 11.3 05/09/2019     Last 3 WBC/ANC  Lab Results   Component Value Date    WBC 6.6 05/12/2019    WBC 9.6 05/11/2019    WBC 9.3 05/09/2019     No components found for: GRNLOCTYABS  Last 3 Platelets  No results found for: PLATELET  Chemistry  [unfilled]  [unfilled]  Lab Results   Component Value Date     09/18/2013     Coagulation Studies  Lab Results   Component Value Date    INR 0.98 05/08/2019     Liver Function Studies  Lab Results   Component Value Date    ALT 26 05/08/2019    AST 18 05/08/2019    ALKPHOS 92 05/08/2019       Recent Imaging        Relevant labs and imaging reviewed    ASSESSMENT AND PLAN     TIA versus presyncope  CT head as well as CTA negative  Follow-up MRI does not show any acute intracranial pathology  Patient symptoms concerning for migraine    Dehydration  Most likely from chronic diarrhea  GI consulted  A CT abdomen negative  Awaiting C. diff test.  Continue IV fluid resuscitation  Continue sucralfate  GI thinking it could be irritable bowel syndrome or bile acid related diarrhea    Acute kidney injury  From dehydration  Continue IV fluid resuscitation  Renal function improving    Hypertension  Patient currently hypotensive most likely from intravascular contraction  Hold antihypertensives  patient advised to hold antihypertensives at home for systolic blood pressure lower than 140 until resolution of the diarrhea    Dyslipidemia  Continue atorvastatin          [facility], Internal Medicine  5/12/2019 at 3:41 PM

## 2019-05-13 ENCOUNTER — APPOINTMENT (OUTPATIENT)
Dept: CT IMAGING | Age: 59
DRG: 092 | End: 2019-05-13
Payer: COMMERCIAL

## 2019-05-13 VITALS
HEART RATE: 75 BPM | TEMPERATURE: 97.8 F | WEIGHT: 286.9 LBS | HEIGHT: 67 IN | BODY MASS INDEX: 45.03 KG/M2 | RESPIRATION RATE: 14 BRPM | SYSTOLIC BLOOD PRESSURE: 138 MMHG | OXYGEN SATURATION: 100 % | DIASTOLIC BLOOD PRESSURE: 77 MMHG

## 2019-05-13 LAB
ANION GAP SERPL CALCULATED.3IONS-SCNC: 9 MMOL/L (ref 4–16)
BUN BLDV-MCNC: 17 MG/DL (ref 6–23)
CALCIUM SERPL-MCNC: 8.5 MG/DL (ref 8.3–10.6)
CHLORIDE BLD-SCNC: 103 MMOL/L (ref 99–110)
CO2: 25 MMOL/L (ref 21–32)
CREAT SERPL-MCNC: 1.1 MG/DL (ref 0.6–1.1)
GFR AFRICAN AMERICAN: >60 ML/MIN/1.73M2
GFR NON-AFRICAN AMERICAN: 51 ML/MIN/1.73M2
GLUCOSE BLD-MCNC: 91 MG/DL (ref 70–99)
HCT VFR BLD CALC: 35.3 % (ref 37–47)
HEMOGLOBIN: 11 GM/DL (ref 12.5–16)
MCH RBC QN AUTO: 31.1 PG (ref 27–31)
MCHC RBC AUTO-ENTMCNC: 31.2 % (ref 32–36)
MCV RBC AUTO: 99.7 FL (ref 78–100)
PDW BLD-RTO: 13.9 % (ref 11.7–14.9)
PLATELET # BLD: 146 K/CU MM (ref 140–440)
PMV BLD AUTO: 9.9 FL (ref 7.5–11.1)
POTASSIUM SERPL-SCNC: 3.9 MMOL/L (ref 3.5–5.1)
RBC # BLD: 3.54 M/CU MM (ref 4.2–5.4)
SODIUM BLD-SCNC: 137 MMOL/L (ref 135–145)
WBC # BLD: 5.8 K/CU MM (ref 4–10.5)

## 2019-05-13 PROCEDURE — 6370000000 HC RX 637 (ALT 250 FOR IP): Performed by: HOSPITALIST

## 2019-05-13 PROCEDURE — 6370000000 HC RX 637 (ALT 250 FOR IP): Performed by: NURSE PRACTITIONER

## 2019-05-13 PROCEDURE — 80048 BASIC METABOLIC PNL TOTAL CA: CPT

## 2019-05-13 PROCEDURE — 2580000003 HC RX 258: Performed by: NURSE PRACTITIONER

## 2019-05-13 PROCEDURE — 94761 N-INVAS EAR/PLS OXIMETRY MLT: CPT

## 2019-05-13 PROCEDURE — 6360000002 HC RX W HCPCS: Performed by: NURSE PRACTITIONER

## 2019-05-13 PROCEDURE — 85027 COMPLETE CBC AUTOMATED: CPT

## 2019-05-13 PROCEDURE — 2580000003 HC RX 258: Performed by: INTERNAL MEDICINE

## 2019-05-13 PROCEDURE — 74174 CTA ABD&PLVS W/CONTRAST: CPT

## 2019-05-13 PROCEDURE — 36415 COLL VENOUS BLD VENIPUNCTURE: CPT

## 2019-05-13 PROCEDURE — 6360000004 HC RX CONTRAST MEDICATION: Performed by: INTERNAL MEDICINE

## 2019-05-13 RX ORDER — SODIUM CHLORIDE 0.9 % (FLUSH) 0.9 %
10 SYRINGE (ML) INJECTION
Status: COMPLETED | OUTPATIENT
Start: 2019-05-13 | End: 2019-05-13

## 2019-05-13 RX ORDER — GABAPENTIN 100 MG/1
100 CAPSULE ORAL 3 TIMES DAILY
Qty: 93 CAPSULE | Refills: 0 | Status: SHIPPED | OUTPATIENT
Start: 2019-05-13 | End: 2019-06-13

## 2019-05-13 RX ORDER — TRAMADOL HYDROCHLORIDE 50 MG/1
50 TABLET ORAL EVERY 6 HOURS PRN
Qty: 15 TABLET | Refills: 0 | Status: SHIPPED | OUTPATIENT
Start: 2019-05-13 | End: 2019-05-16

## 2019-05-13 RX ORDER — TRAMADOL HYDROCHLORIDE 50 MG/1
50 TABLET ORAL EVERY 6 HOURS PRN
Qty: 15 TABLET | Refills: 0 | Status: SHIPPED | OUTPATIENT
Start: 2019-05-13 | End: 2019-05-13

## 2019-05-13 RX ORDER — ATORVASTATIN CALCIUM 40 MG/1
40 TABLET, FILM COATED ORAL NIGHTLY
Qty: 30 TABLET | Refills: 3 | Status: SHIPPED | OUTPATIENT
Start: 2019-05-13

## 2019-05-13 RX ADMIN — GABAPENTIN 100 MG: 100 CAPSULE ORAL at 14:37

## 2019-05-13 RX ADMIN — HEPARIN SODIUM 5000 UNITS: 5000 INJECTION, SOLUTION INTRAVENOUS; SUBCUTANEOUS at 05:03

## 2019-05-13 RX ADMIN — TRAMADOL HYDROCHLORIDE 50 MG: 50 TABLET, FILM COATED ORAL at 05:03

## 2019-05-13 RX ADMIN — GABAPENTIN 100 MG: 100 CAPSULE ORAL at 08:39

## 2019-05-13 RX ADMIN — BACLOFEN 10 MG: 10 TABLET ORAL at 08:39

## 2019-05-13 RX ADMIN — BACLOFEN 10 MG: 10 TABLET ORAL at 14:37

## 2019-05-13 RX ADMIN — SODIUM CHLORIDE, PRESERVATIVE FREE 10 ML: 5 INJECTION INTRAVENOUS at 08:40

## 2019-05-13 RX ADMIN — TRAMADOL HYDROCHLORIDE 50 MG: 50 TABLET, FILM COATED ORAL at 11:58

## 2019-05-13 RX ADMIN — FAMOTIDINE 20 MG: 20 TABLET ORAL at 08:39

## 2019-05-13 RX ADMIN — TRAMADOL HYDROCHLORIDE 50 MG: 50 TABLET, FILM COATED ORAL at 21:24

## 2019-05-13 RX ADMIN — IOPAMIDOL 100 ML: 755 INJECTION, SOLUTION INTRAVENOUS at 16:09

## 2019-05-13 RX ADMIN — HEPARIN SODIUM 5000 UNITS: 5000 INJECTION, SOLUTION INTRAVENOUS; SUBCUTANEOUS at 14:37

## 2019-05-13 RX ADMIN — LEVOTHYROXINE SODIUM 125 MCG: 0.12 TABLET ORAL at 05:03

## 2019-05-13 RX ADMIN — SODIUM CHLORIDE, PRESERVATIVE FREE 10 ML: 5 INJECTION INTRAVENOUS at 16:10

## 2019-05-13 RX ADMIN — ASPIRIN 81 MG: 81 TABLET, COATED ORAL at 08:39

## 2019-05-13 RX ADMIN — MECLIZINE 12.5 MG: 12.5 TABLET ORAL at 05:02

## 2019-05-13 RX ADMIN — SUCRALFATE 1 G: 1 TABLET ORAL at 08:39

## 2019-05-13 ASSESSMENT — PAIN DESCRIPTION - LOCATION: LOCATION: HEAD

## 2019-05-13 ASSESSMENT — PAIN DESCRIPTION - PAIN TYPE: TYPE: ACUTE PAIN

## 2019-05-13 ASSESSMENT — PAIN DESCRIPTION - FREQUENCY: FREQUENCY: CONTINUOUS

## 2019-05-13 ASSESSMENT — PAIN SCALES - GENERAL
PAINLEVEL_OUTOF10: 7
PAINLEVEL_OUTOF10: 7
PAINLEVEL_OUTOF10: 6

## 2019-05-13 ASSESSMENT — PAIN DESCRIPTION - DESCRIPTORS: DESCRIPTORS: ACHING

## 2019-05-13 NOTE — DISCHARGE INSTR - OTHER ORDERS
Follow-up With  Details  Why  Contact Info   Diane Tam MD  Go on 5/17/2019  Follow up 1:00 PM  [address] [facility]  [phone]   Gerson Day,   In 1 week  isac, do a bmp prior to visit  [address] [facility] [address] [employer] [facility] [address] [address]      [address] [facility], [address] [place] [address],[address]  [place] [address]  [phone]

## 2019-05-13 NOTE — PROGRESS NOTES
[facility]        Progress Note       2019  9:04 AM    Patient:    Fairview Seats  : 1960   64 y.o. MRN: [id number]  Admitted: 2019  5:26 PM ATT: Romayne Hickory, MD   3011/[id number]-A  AdmitDx: Hypokalemia [E87.6]  Dizziness [R42]  Facial numbness [R20.0]  Serum creatinine raised [R79.89]  Left facial numbness [R20.0]  Left facial numbness [R20.0]  PCP: Lisandro Rivera MD    SUBJECTIVE:  Chart reviewed, events noted  Patient feeling better. Last BM before admission. Abd pain cramping/ twisting when eating. Now dull constant pain, radiating to whole abd. Rating pain 4/10. Tolerating diet 1 cup at a time, x3 day. Trying to drink as much as she can. Decrease appetitie. Can get sick with smell of food.        ROS:  The positive ROS will be identified in bold     CONSTITUTIONAL:  Neg  Weight loss, fatigue, fever  RESPIRATORY:   Neg SOB, wheeze, cough, hemoptysis or bronchitis  CARDIOVASCULAR:  Neg Chest pain, palpitations, dyspnea on exertion, edema  GASTROINTESTINAL:  SEE HPI  HEMATOLOGIC/LYMPHATIC:  Neg  Anemia, bleeding tendency    OBJECTIVE:      /66   Pulse 67   Temp 98.5 °F (36.9 °C) (Oral)   Resp 13   Ht 5' 7\" (1.702 m)   Wt 286 lb 14.4 oz (130.1 kg)   SpO2 100%   BMI 44.93 kg/m²     NAD, appears comfortable  Lips and mucous membranes pink and moist  RRR, Nl s1s2  Lungs CTA bilaterally, respirations even and unlabored   Abdomen soft, ND, NT, bowel sounds normal  Skin pink, warm and dry  Trace edema bilateral lower extremities   AAOx3    CBC:   Recent Labs     19  0439 19  0345   WBC 9.6 6.6 5.8   HGB 11.9* 10.6* 11.0*    166 146     BMP:    Recent Labs     19  0439 19  0345   * 134* 137   K 4.2 3.7 3.9   CL 96* 100 103   CO2 25 23 25   BUN 27* 24* 17   CREATININE 1.4* 1.3* 1.1   GLUCOSE 95 88 91     Magnesium:   Lab Results   Component Value Date    MG 2.2 2019     Hepatic: No results for input(s): AST, ALT, ALB, BILITOT, ALKPHOS in the last 72 hours. INR: No results for input(s): INR in the last 72 hours.       Intake/Output Summary (Last 24 hours) at 5/13/2019 0904  Last data filed at 5/12/2019 1004  Gross per 24 hour   Intake 480 ml   Output --   Net 480 ml         Medications    Scheduled Medications:    famotidine  20 mg Oral BID    gabapentin  100 mg Oral TID    baclofen  10 mg Oral TID    levothyroxine  125 mcg Oral Daily    sodium chloride flush  10 mL Intravenous 2 times per day    aspirin  81 mg Oral Daily    sucralfate  1 g Oral BID    atorvastatin  40 mg Oral Nightly    heparin (porcine)  5,000 Units Subcutaneous 3 times per day     PRN Medications: meclizine, traMADol, dicyclomine, sodium chloride flush, ondansetron, potassium chloride **OR** potassium alternative oral replacement **OR** potassium chloride  Infusions:    0.45 % NaCl with KCl 20 mEq 75 mL/hr at 05/12/19 2004           Patient Active Problem List   Diagnosis Code    Chest pain R07.9    Hypothyroidism E03.9    Mediastinal lymphadenopathy R59.0    Lung nodule R91.1    Intractable back pain M54.9    Histoplasmosis B39.9    Acute pericarditis I30.9    Factitious disorder F68.10    Disseminated histoplasmosis B39.9    Depression F32.9    Hypothyroidism E03.9    Hypertension I10    History of hysterectomy Z90.710    History of histoplasmosis Z86.19    Chest pain R07.9    Fatigue R53.83    Kidney stones N20.0    Peptic ulcer K27.9    Syncope and collapse R55    Left facial numbness R20.0    Acute kidney injury (HCC) N17.9    Acute hypokalemia E87.6    Stroke-like symptoms R29.90    Class 3 severe obesity due to excess calories with body mass index (BMI) of 40.0 to 44.9 in adult (HCC) E66.01, Z68.41    Diarrhea of presumed infectious origin R19.7    Abdominal pain R10.9    Dizziness R42     Assessment:  ABD Pain -persistent  Diarrhea- resolved at this time  Most likely IBS-D or SIBO from recent antibiotics  Stool studies need collected  C Scope 4/19/19 normal, bx normal  EGD 3/19/19 gastritis  XR acute abd 5/12/19 no acute  CT 5/11/19 No acute    Recommendations:  CTA abd   Continue Bently as needed    Discussed plan of care with patient and family     Patient clinical, biochemical, and radiological information discussed with Dr. Ruufs San. He agrees with the assessment and plan. Kiana Combs CNP  5/13/2019  9:04 AM     I have seen and examined this patient personally, and independently of the nurse practitioner. The plan was developed mutually at the time of the visit with the patient. Kiana Combs and myself have spoken with patient, nursing staff and provided written and verbal instructions .     The above note has been reviewed and I agree with the Assessment,  Diagnosis, and Treatment plan as suggested by Kiana Combs CNP      [address] [facility] gastroenterology

## 2019-05-13 NOTE — PROGRESS NOTES
This nurse attempted to discharge this pt at 1830 per orders given and  by Dr Simona Driver. Prior to discharge this nurse checked with Anastasia Fraser, Dr Natalya Brown NP to ensure that this pt could follow up outpatient for stools considering she has not had any loss stools since Friday. Lázaro did ok this at [phone]. This pt had already asked this nurse to clarify who she would see in the office for neuro on the 31. This nurse spoke with Flynn López the NP for this pt's neurologist and she clarified that she would see the actual neurologist. When this nurse attempted to discharge this pt at [phone], pt stated Dr Maikol Zafar told her specifically that she would be staying another night and that he wanted further blood work and further testing. This nurse checked the chart for a note from Dr Maikol Zafar and did not see one. This nurse did not see Dr Maikol Zafar on the unit during the shift as well. This nurse perfect served/called Dr Simona Driver to make him aware and he said he would call Dr Maikol Zafar to clarify that he is ok with discharge. During evening report with the oncoming night nurse, Stefania Haddad, Dr Simona Driver was called and spoke with regarding discharge and he stated he spoke with Dr Maikol Zafar and clarified that this pt was ok for discharge after CT of abdomen, which was done this afternoon. When this was communicated to this pt, she became irate, stating that there was poor communication regarding this hospital, doctors, and staff. She also was upset because she stated she needed her home care set up and she would have no medical staff available to her for her needs. This nurse had addressed this when she was attempting to discharge this pt stating that they would be called and would see her tomorrow. She claimed that this nurse was dishonest about discharge and this nurse clarified again what was said and the hospital policy on discharge while the night nurse was in the room still.  This pt began to state that heads were going to roll and that she was going to administration immediately. This nurse and the night nurse informed her we were unsure if there was any administration here tonight but she was able to speak with the nursing supervisor. She wanted that and stated she would get dressed and ready to go. During documenting this note 2 physicians did come to the unit and attempt to speak with her regarding this matter. This nurse informed them of what was going on and they proceeded to the room to speak wit her. Nursing supervisor Abrahan was informed of this as well by night nurse Kali Arellano, and he stated her would be up to speak with her per Kali Arlelano. Pt has private insurance and does not have medicare or Medicaid at this time. Paper work for discharge is completed and printed and in pt's file.

## 2019-05-13 NOTE — PLAN OF CARE
Problem: Falls - Risk of:  Goal: Will remain free from falls  Description  Will remain free from falls  Outcome: Ongoing  Goal: Absence of physical injury  Description  Absence of physical injury  Outcome: Ongoing     Problem: Pain:  Goal: Pain level will decrease  Description  Pain level will decrease  Outcome: Ongoing  Goal: Control of acute pain  Description  Control of acute pain  Outcome: Ongoing  Goal: Control of chronic pain  Description  Control of chronic pain  Outcome: Ongoing     Problem: Musculor/Skeletal Functional Status  Goal: Highest potential functional level  Outcome: Ongoing

## 2019-05-13 NOTE — DISCHARGE SUMMARY
Discharge Summary    Name:  Rachel Bains /Age/Sex: 1960  (64 y.o. female)   MRN & CSN:  [id number] & [id number] Admission Date/Time: 2019  5:26 PM   Attending:  Denver Gable, MD Discharging Physician: Denver Gable, MD     Hospital Course:   Rachel Bians is a 64 y.o.  female  who presents with Stroke-like symptoms        The patient expressed appropriate understanding of and agreement with the discharge recommendations, medications, and plan. Consults this admission:  IP CONSULT TO HOSPITALIST  IP CONSULT TO NEPHROLOGY  IP CONSULT TO DIETITIAN  IP CONSULT TO NEUROLOGY  IP CONSULT TO SPIRITUAL SERVICES  IP CONSULT TO GI  IP CONSULT TO GI    Discharge Instruction:   Follow up appointments: ***  Primary care physician:  within 2 weeks    Diet:  {diet:64574}   Activity: {discharge activity:81942}  Disposition: Discharged to:   []Home, [][facility], []SNF, []Acute Rehab, []Hospice ***  Condition on discharge: Stable    Discharge Medications:      Eric Orellana   [place] Medication Instructions [id number]    Printed on:19 6370   Medication Information                      amLODIPine (NORVASC) 5 MG tablet  Take 5 mg by mouth daily             atorvastatin (LIPITOR) 40 MG tablet  Take 1 tablet by mouth nightly             baclofen (LIORESAL) 10 MG tablet  Take 10 mg by mouth 3 times daily             chlorthalidone (HYGROTEN) 50 MG tablet  Take 50 mg by mouth daily             dicyclomine (BENTYL) 20 MG tablet  Take 1 tablet by mouth 3 times daily as needed (stomach spasms)             gabapentin (NEURONTIN) 100 MG capsule  Take 1 capsule by mouth 3 times daily for 31 days.              levothyroxine (SYNTHROID) 125 MCG tablet  Take 1 tablet by mouth daily             methotrexate (RHEUMATREX) 2.5 MG chemo tablet  Take 12.5 mg by mouth once a week             predniSONE (DELTASONE) 10 MG tablet  Take 10 mg by mouth daily Indications: unsure of dose              traMADol (ULTRAM) 50 MG

## 2019-05-13 NOTE — PROGRESS NOTES
[facility] Liaison aware of discharge & will initiate [facility].

## 2019-05-13 NOTE — DISCHARGE SUMMARY
Discharge Summary    Name:  Julio Clements /Age/Sex: 1960  (64 y.o. female)   MRN & CSN:  [id number] & [id number] Admission Date/Time: 2019  5:26 PM   Attending:  Naty Pederson MD Discharging Physician: Naty Pederson MD     Hospital Course:     Discharge diagnoses    Complicated migraine  Chronic diarrhea  Dehydration  Acute kidney injury  Hypertension  Hypotension  Dyslipidemia next    Julio Clements is a 64 y.o. female who presents with left facial numbness, headache, photophobia and lightheadedness with slurred speech. She was admitted for concerns for CVA. Initial head imaging , head CT, CTA, done was negative. Patient on presentation also had acute kidney injury and was started on IV fluid resuscitation with an improvement in renal function. She also had hypokalemia on presentation. She has hypertension and blood pressure was controlled on presentation. Neurology as well as nephrology consulted    Patient reports having chronic diarrhea which had been ongoing for about 6 weeks  And she has been follow-up with GI, has had an EGD as well as colonoscopy done and is currently on antidiarrheal medication with no improvement. She reports having about 5 watery stools a day and obviously she is not drinking enough to replace her fluid loss. On presentation she was hypotensive with blood pressure going down to as low as in the 80s and this could be the cause of her neurologic presentation. She received IV fluid resuscitation with normalization of her blood pressure. The diarrhea stopped during the course of stay in the hospital.  She had no abdominal pain. She had no fever or leukocytosis. She was seen by GI and no further intervention was recommended at this point. Patient to follow-up with Dr. Terris Meckel on outpatient basis. Patient's neurological symptoms, the left facial numbness, with a phobia, lightheadedness as well as slurred speech resolved.   Headache, remained persistent for which neurology started her on gabapentin. Patient discharge home on gabapentin. There was an improvement in her ambulation. Jerrod Cantor requires a cane due to impaired ambulation and for increased stability in order to participate in or complete daily living tasks of: eating, bathing, toileting, personal cares, ambulating, grooming, hygiene, dressing upper body and dressing lower body.  The patient is able to safely use the cane and the functional mobility deficit can be sufficiently resolved. The patient expressed appropriate understanding of and agreement with the discharge recommendations, medications, and plan.      Consults this admission:  IP CONSULT TO HOSPITALIST  IP CONSULT TO NEPHROLOGY  IP CONSULT TO DIETITIAN  IP CONSULT TO NEUROLOGY  IP CONSULT TO [address] TO GI  IP CONSULT TO GI  IP CONSULT TO [address]    Discharge Instruction:   Follow up appointments:   Neurology: Within 2 weeks  GI: Within 2 weeks  Primary care physician:  within 2 weeks    Diet:  regular diet   Activity: activity as tolerated  Disposition: Discharged to:   [x]Home, [][facility], []SNF, []Acute Rehab, []Hospice   Condition on discharge: Stable    Discharge Medications:      Nada Chen   Home Medication Instructions [id number]    Printed on:05/13/19 9432   Medication Information                      amLODIPine (NORVASC) 5 MG tablet  Take 5 mg by mouth daily             atorvastatin (LIPITOR) 40 MG tablet  Take 1 tablet by mouth nightly             baclofen (LIORESAL) 10 MG tablet  Take 10 mg by mouth 3 times daily             chlorthalidone (HYGROTEN) 50 MG tablet  Take 50 mg by mouth daily             dicyclomine (BENTYL) 20 MG tablet  Take 1 tablet by mouth 3 times daily as needed (stomach spasms)             gabapentin (NEURONTIN) 100 MG capsule  Take 1 capsule by mouth 3 times daily for 31 days.              levothyroxine (SYNTHROID) 125 MCG tablet  Take 1 tablet by mouth daily methotrexate (RHEUMATREX) 2.5 MG chemo tablet  Take 12.5 mg by mouth once a week             predniSONE (DELTASONE) 10 MG tablet  Take 10 mg by mouth daily Indications: unsure of dose              traMADol (ULTRAM) 50 MG tablet  Take 1 tablet by mouth every 6 hours as needed for Pain for up to 3 days. valsartan (DIOVAN) 320 MG tablet  Take 320 mg by mouth daily                 Objective Findings at Discharge:   /77   Pulse 75   Temp 97.8 °F (36.6 °C) (Oral)   Resp 14   Ht 5' 7\" (1.702 m)   Wt 286 lb 14.4 oz (130.1 kg)   SpO2 100%   BMI 44.93 kg/m²            PHYSICAL EXAM     General - AAO x 3  Psych - Appropriate affect/speech. No agitation  Eyes - Eye lids intact. No scleral icterus  ENT - Lips wnl. External ear clear/dry/intact. No thyromegaly on inspection  Neuro - No gross peripheral or central neuro deficits on inspection  Heart - Sinus. RRR. S1 and S2 present. No added HS/murmurs appreciated. No elevated JVD appreciated  Lung - Adequate air entry b/l, No crackles/wheezes appreciated  GI -  Soft. No guarding/rigidity. No hepatosplenomegaly/ascites. BS+  Skin - Intact. No rash/petechiae/ecchymosis. Warm extremities  MSK - Joints with normal ROM.  No joint swellings    BMP/CBC  Recent Labs     05/11/19  1647 05/12/19  0439 05/13/19  0345   * 134* 137   K 4.2 3.7 3.9   CL 96* 100 103   CO2 25 23 25   BUN 27* 24* 17   CREATININE 1.4* 1.3* 1.1   WBC 9.6 6.6 5.8   HCT 37.7 33.6* 35.3*    166 146         Discharge Time of 35 minutes    Electronically signed by Jesus Alberto Mccoy MD on 5/13/2019 at 5:46 PM

## 2019-05-13 NOTE — CARE COORDINATION
Spoke with pt about dc plans and she states she does not need a cane but instead a rollator walker with a seat. Order updated and faxed to [facility]. Pt is aware she will have an out of pocket expense though it is not easily known at this time. She is agreeable to [address]. CM following.

## 2019-05-13 NOTE — CARE COORDINATION
Baldo Benavides was evaluated today and a DME order was entered for a wheeled walker with seat because she requires this to successfully complete daily living tasks of eating, bathing, toileting, personal cares, ambulating, grooming, hygiene, dressing upper body, dressing lower body, meal preparation and taking own medications. A wheeled walker with seat is necessary due to the patient's unsteady gait, upper body weakness, inability to  and ambulation device, ambulating only short distances by pushing a walker, and the need to sit for a short time before resuming ambulation. These tasks cannot be completed with a lesser ambulation device such as a cane, crutch, or standard walker. The need for this equipment was discussed with the patient and she understands and is in agreement.

## 2019-05-14 NOTE — PROGRESS NOTES
Had to re-print patient's Tramadol script as the first wasn't signed by discharging MD.  [address] Diamond, who stated destroy the first, not need to be returned.

## 2019-05-14 NOTE — PROGRESS NOTES
Patient made mentioned that she was dissatisfaction with the way medical information was relayed to her. She made several threats against the hospital. She clearly did not want to be discharged yesterday/last night. Dr David Fitch CNP, and house supervisor spoke to her at length. She was eventually discharged at 21:30. The delay with the discharge was due to her not wanting to leave even though she was cleared by her doctors.

## 2019-05-15 ENCOUNTER — HOSPITAL ENCOUNTER (OUTPATIENT)
Age: 59
Setting detail: SPECIMEN
Discharge: HOME | End: 2019-05-15
Payer: COMMERCIAL

## 2019-05-15 LAB
ANION GAP SERPL CALCULATED.3IONS-SCNC: 18 MMOL/L (ref 4–16)
BUN BLDV-MCNC: 12 MG/DL (ref 6–23)
CALCIUM SERPL-MCNC: 9.9 MG/DL (ref 8.3–10.6)
CHLORIDE BLD-SCNC: 100 MMOL/L (ref 99–110)
CO2: 23 MMOL/L (ref 21–32)
CREAT SERPL-MCNC: 0.9 MG/DL (ref 0.6–1.1)
EKG ATRIAL RATE: 87 BPM
EKG DIAGNOSIS: NORMAL
EKG P AXIS: 79 DEGREES
EKG P-R INTERVAL: 136 MS
EKG Q-T INTERVAL: 392 MS
EKG QRS DURATION: 86 MS
EKG QTC CALCULATION (BAZETT): 471 MS
EKG R AXIS: 56 DEGREES
EKG T AXIS: 34 DEGREES
EKG VENTRICULAR RATE: 87 BPM
GFR AFRICAN AMERICAN: >60 ML/MIN/1.73M2
GFR NON-AFRICAN AMERICAN: >60 ML/MIN/1.73M2
GLUCOSE BLD-MCNC: 87 MG/DL (ref 70–99)
POTASSIUM SERPL-SCNC: 4 MMOL/L (ref 3.5–5.1)
SODIUM BLD-SCNC: 141 MMOL/L (ref 135–145)

## 2019-05-15 PROCEDURE — 80048 BASIC METABOLIC PNL TOTAL CA: CPT

## 2019-05-24 ENCOUNTER — HOSPITAL ENCOUNTER (OUTPATIENT)
Age: 59
Discharge: HOME | End: 2019-05-24
Payer: COMMERCIAL

## 2019-05-24 ENCOUNTER — HOSPITAL ENCOUNTER (OUTPATIENT)
Dept: GENERAL RADIOLOGY | Age: 59
Discharge: HOME | End: 2019-05-24
Payer: COMMERCIAL

## 2019-05-24 DIAGNOSIS — Z11.1 VISIT FOR TB SKIN TEST: ICD-10-CM

## 2019-05-24 PROCEDURE — 36415 COLL VENOUS BLD VENIPUNCTURE: CPT

## 2019-05-24 PROCEDURE — 71046 X-RAY EXAM CHEST 2 VIEWS: CPT

## 2019-05-24 PROCEDURE — 86480 TB TEST CELL IMMUN MEASURE: CPT

## 2019-05-28 ENCOUNTER — HOSPITAL ENCOUNTER (OUTPATIENT)
Age: 59
Setting detail: SPECIMEN
Discharge: HOME | End: 2019-05-28
Payer: COMMERCIAL

## 2019-05-28 LAB
ANION GAP SERPL CALCULATED.3IONS-SCNC: 12 MMOL/L (ref 4–16)
BUN BLDV-MCNC: 20 MG/DL (ref 6–23)
CALCIUM SERPL-MCNC: 9 MG/DL (ref 8.3–10.6)
CHLORIDE BLD-SCNC: 105 MMOL/L (ref 99–110)
CO2: 23 MMOL/L (ref 21–32)
CREAT SERPL-MCNC: 0.9 MG/DL (ref 0.6–1.1)
GFR AFRICAN AMERICAN: >60 ML/MIN/1.73M2
GFR NON-AFRICAN AMERICAN: >60 ML/MIN/1.73M2
GLUCOSE BLD-MCNC: 77 MG/DL (ref 70–99)
POTASSIUM SERPL-SCNC: 4.3 MMOL/L (ref 3.5–5.1)
SODIUM BLD-SCNC: 140 MMOL/L (ref 135–145)

## 2019-05-28 PROCEDURE — 80048 BASIC METABOLIC PNL TOTAL CA: CPT

## 2019-05-29 LAB
QUANTI TB1 MINUS NIL: 0.01 IU/ML (ref 0–0.34)
QUANTI TB2 MINUS NIL: 0 IU/ML (ref 0–0.34)
QUANTIFERON (R) TB GOLD (INCUBATED): NEGATIVE
QUANTIFERON (R) TB GOLD (INCUBATED): NORMAL
QUANTIFERON MITOGEN MINUS NIL: 9.61 IU/ML
QUANTIFERON NIL: 0.01 IU/ML
QUANTIFERON NIL: NORMAL IU/ML

## 2019-09-25 ENCOUNTER — HOSPITAL ENCOUNTER (OUTPATIENT)
Dept: GENERAL RADIOLOGY | Age: 59
Discharge: HOME | End: 2019-09-25
Payer: COMMERCIAL

## 2019-09-25 ENCOUNTER — HOSPITAL ENCOUNTER (OUTPATIENT)
Age: 59
Discharge: HOME | End: 2019-09-25
Payer: COMMERCIAL

## 2019-09-25 DIAGNOSIS — M54.5 LOW BACK PAIN, UNSPECIFIED BACK PAIN LATERALITY, UNSPECIFIED CHRONICITY, WITH SCIATICA PRESENCE UNSPECIFIED: ICD-10-CM

## 2019-09-25 PROCEDURE — 72072 X-RAY EXAM THORAC SPINE 3VWS: CPT

## 2019-09-25 PROCEDURE — 72100 X-RAY EXAM L-S SPINE 2/3 VWS: CPT

## 2020-02-13 ENCOUNTER — HOSPITAL ENCOUNTER (OUTPATIENT)
Dept: GENERAL RADIOLOGY | Age: 60
Discharge: HOME | End: 2020-02-13
Payer: COMMERCIAL

## 2020-02-13 ENCOUNTER — HOSPITAL ENCOUNTER (OUTPATIENT)
Age: 60
Discharge: HOME | End: 2020-02-13
Payer: COMMERCIAL

## 2020-02-13 PROCEDURE — 72100 X-RAY EXAM L-S SPINE 2/3 VWS: CPT

## 2020-02-13 PROCEDURE — 73501 X-RAY EXAM HIP UNI 1 VIEW: CPT

## 2020-02-19 ENCOUNTER — HOSPITAL ENCOUNTER (OUTPATIENT)
Dept: MRI IMAGING | Age: 60
Discharge: HOME | End: 2020-02-19
Payer: COMMERCIAL

## 2020-02-19 PROCEDURE — 72148 MRI LUMBAR SPINE W/O DYE: CPT

## 2024-04-16 ENCOUNTER — OFFICE VISIT (OUTPATIENT)
Dept: ORTHOPEDIC SURGERY | Age: 64
End: 2024-04-16
Payer: COMMERCIAL

## 2024-04-16 VITALS — TEMPERATURE: 97.2 F | HEART RATE: 68 BPM | RESPIRATION RATE: 19 BRPM | OXYGEN SATURATION: 96 %

## 2024-04-16 DIAGNOSIS — S83.92XA SPRAIN OF LEFT KNEE, UNSPECIFIED LIGAMENT, INITIAL ENCOUNTER: ICD-10-CM

## 2024-04-16 DIAGNOSIS — M17.12 PRIMARY OSTEOARTHRITIS OF LEFT KNEE: Primary | ICD-10-CM

## 2024-04-16 PROCEDURE — 99203 OFFICE O/P NEW LOW 30 MIN: CPT | Performed by: ORTHOPAEDIC SURGERY

## 2024-04-16 RX ORDER — IBUPROFEN 800 MG/1
800 TABLET ORAL EVERY 8 HOURS PRN
Qty: 60 TABLET | Refills: 1 | Status: SHIPPED | OUTPATIENT
Start: 2024-04-16

## 2024-04-16 ASSESSMENT — ENCOUNTER SYMPTOMS
WHEEZING: 0
COLOR CHANGE: 0
EYE REDNESS: 0
CHEST TIGHTNESS: 0
EYE PAIN: 0
VOMITING: 0
SHORTNESS OF BREATH: 0

## 2024-04-16 NOTE — PATIENT INSTRUCTIONS
Central Scheduling # [phone]    MRI of left knee  Follow-up in 2-3 weeks to go over MRI.  Weightbearing as tolerated  Over-the-counter medication as needed for pain    We are committed to providing you the best care possible.  If you receive a survey after visiting one of our offices, please take time to share your experience concerning your physician office visit.  These surveys are confidential and no health information about you is shared.  We are eager to improve for you and we are counting on your feedback to help make that happen.

## 2024-04-16 NOTE — PROGRESS NOTES
4/16/2024   Chief Complaint   Patient presents with    Consultation    Knee Pain     Left        History of Present Illness:                             Marisa Feliz is a 63 y.o. female who presents today for evaluation of her left knee pain.  She has a history of chronic left knee pain and problems related to previous injuries and surgeries.    She had a new onset injury on 4/9/2024 when she stood up from chair and felt her knee buckle.  She felt a crunch and felt instability at the knee.  She had worsening pain and swelling and stiffness following the injury.  She was seen in the emergency room and x-rays were taken which were negative for fracture.  She was given crutches and a knee brace and was referred here.    She has previously had knee arthroscopy procedures performed for ACL tear, meniscus problems, and degenerative joint disease.  Her ACL reconstruction was performed by another orthopedic fall and instability event was much more severe than what she is experienced in the past.    Patient seen in office today for: left knee pain, felt \"crunch\" when standing from chair. Patient c/o weakness and loosening.  Swelling is present.      Hx of ACL replacement of left knee, 11-12 years ago Dr Gross      Patient reports 5/10 pain on the lateral side of the left knee  RICE and medication are effective to alleviate pain and reduce swelling. Bracing is not being used d/t pain and the brace is falling down as well      Pain worsened by: Patient reports painful ROM & weight bearing. Steps are difficult, she has about 20 steps in the home.      Xrays performed in office today.      Profession:         Medical History  Patient's medications, allergies, past medical, surgical, social and family histories were reviewed and updated as appropriate.    Past Medical History:   Diagnosis Date    Anxiety     \"work related\"    Arthritis     Chest pain     Fatigue     Fatigue     \"increased fatigue, random

## 2024-04-16 NOTE — PROGRESS NOTES
Patient seen in office today for: left knee pain, felt \"crunch\" when standing from chair. Patient c/o weakness and loosening.  Swelling is present.     Hx of ACL replacement of left knee, 11-12 years ago Dr Gross     Patient reports 5/10 pain on the lateral side of the left knee  RICE and medication are effective to alleviate pain and reduce swelling. Bracing is not being used d/t pain and the brace is falling down as well     Pain worsened by: Patient reports painful ROM & weight bearing. Steps are difficult, she has about 20 steps in the home.     Xrays performed in office today.     Profession:

## 2024-04-24 ENCOUNTER — HOSPITAL ENCOUNTER (OUTPATIENT)
Dept: MRI IMAGING | Age: 64
Discharge: HOME | End: 2024-04-24
Attending: ORTHOPAEDIC SURGERY
Payer: COMMERCIAL

## 2024-04-24 DIAGNOSIS — M17.12 PRIMARY OSTEOARTHRITIS OF LEFT KNEE: ICD-10-CM

## 2024-04-24 DIAGNOSIS — S83.92XA SPRAIN OF LEFT KNEE, UNSPECIFIED LIGAMENT, INITIAL ENCOUNTER: ICD-10-CM

## 2024-04-24 PROCEDURE — 73721 MRI JNT OF LWR EXTRE W/O DYE: CPT

## 2024-05-07 ENCOUNTER — OFFICE VISIT (OUTPATIENT)
Dept: ORTHOPEDIC SURGERY | Age: 64
End: 2024-05-07
Payer: COMMERCIAL

## 2024-05-07 VITALS — OXYGEN SATURATION: 98 % | HEART RATE: 72 BPM | TEMPERATURE: 97.5 F

## 2024-05-07 DIAGNOSIS — S83.512A CHRONIC RUPTURE OF ACL OF LEFT KNEE: ICD-10-CM

## 2024-05-07 DIAGNOSIS — M17.12 PRIMARY OSTEOARTHRITIS OF LEFT KNEE: Primary | ICD-10-CM

## 2024-05-07 DIAGNOSIS — S83.232A COMPLEX TEAR OF MEDIAL MENISCUS OF LEFT KNEE AS CURRENT INJURY, INITIAL ENCOUNTER: ICD-10-CM

## 2024-05-07 DIAGNOSIS — M25.562 LEFT KNEE PAIN, UNSPECIFIED CHRONICITY: ICD-10-CM

## 2024-05-07 PROCEDURE — 20610 DRAIN/INJ JOINT/BURSA W/O US: CPT | Performed by: ORTHOPAEDIC SURGERY

## 2024-05-07 PROCEDURE — 99214 OFFICE O/P EST MOD 30 MIN: CPT | Performed by: ORTHOPAEDIC SURGERY

## 2024-05-07 RX ORDER — TRIAMCINOLONE ACETONIDE 40 MG/ML
40 INJECTION, SUSPENSION INTRA-ARTICULAR; INTRAMUSCULAR ONCE
Status: COMPLETED | OUTPATIENT
Start: 2024-05-07 | End: 2024-05-07

## 2024-05-07 RX ADMIN — TRIAMCINOLONE ACETONIDE 40 MG: 40 INJECTION, SUSPENSION INTRA-ARTICULAR; INTRAMUSCULAR at 11:42

## 2024-05-07 ASSESSMENT — ENCOUNTER SYMPTOMS
COLOR CHANGE: 0
SHORTNESS OF BREATH: 0
CHEST TIGHTNESS: 0

## 2024-05-07 NOTE — PROGRESS NOTES
Left knee, MRI FU    IMPRESSION:  Moderate tricompartmental osteoarthrosis, most pronounced involving the lateral patellar facet.     Chronic rupture of the ACL graft.     Within the limitations of study given the poor signal-to-noise ratio, there appears to be intrasubstance signal throughout the medial meniscus, with blunted inner margin of the posterior horn, which may reflect postoperative change versus myxoid   degenerative signal with inner margin tear involving the posterior horn.     Small to moderate suprapatellar joint effusion with intra-articular loose bodies within the posterior joint capsule and dependent portion of the effusion laterally.  
intact.  No tenderness to palpation at the hip.     Diagnostic testing:  X-ray images were reviewed by myself and discussed with the patient:  XR KNEE LEFT (MIN 4 VIEWS)     Result Date: 4/16/2024  X-ray images of the left knee demonstrate evidence of moderate degenerative joint disease most significant the lateral compartment with joint space narrowing and subchondral sclerosis with mild irregularities of the articular surface on both sides of the lateral compartment.  There is a metallic button present at the distal femur consistent with history of previous ACL reconstruction and associated healed bone tunnels present.  There is normal alignment.  No evidence of fracture or acute abnormality.  Mild joint effusion.  Impression: Moderate degenerative changes left knee      MRI images of the left knee were reviewed by myself and discussed with the patient:  Left knee, MRI FU     IMPRESSION:  Moderate tricompartmental osteoarthrosis, most pronounced involving the lateral patellar facet.     Chronic rupture of the ACL graft.     Within the limitations of study given the poor signal-to-noise ratio, there appears to be intrasubstance signal throughout the medial meniscus, with blunted inner margin of the posterior horn, which may reflect postoperative change versus myxoid   degenerative signal with inner margin tear involving the posterior horn.     Small to moderate suprapatellar joint effusion with intra-articular loose bodies within the posterior joint capsule and dependent portion of the effusion laterally.       Office Procedures:  No orders of the defined types were placed in this encounter.      Assessment and Plan  1.  Left knee primary osteoarthritis    2.  Left knee complex medial meniscus tear    3.  Left knee chronic ACL disruption with history of previous ACL reconstruction    We reviewed the MRI findings in detail.  I explained that the main finding is the tricompartmental degenerative joint disease.  In

## 2024-06-14 ENCOUNTER — OFFICE VISIT (OUTPATIENT)
Dept: ORTHOPEDIC SURGERY | Age: 64
End: 2024-06-14
Payer: COMMERCIAL

## 2024-06-14 VITALS
OXYGEN SATURATION: 97 % | BODY MASS INDEX: 44.93 KG/M2 | HEART RATE: 65 BPM | HEIGHT: 67 IN | DIASTOLIC BLOOD PRESSURE: 129 MMHG | SYSTOLIC BLOOD PRESSURE: 178 MMHG

## 2024-06-14 DIAGNOSIS — M17.12 PRIMARY OSTEOARTHRITIS OF LEFT KNEE: Primary | ICD-10-CM

## 2024-06-14 PROCEDURE — 3080F DIAST BP >= 90 MM HG: CPT | Performed by: ORTHOPAEDIC SURGERY

## 2024-06-14 PROCEDURE — 3077F SYST BP >= 140 MM HG: CPT | Performed by: ORTHOPAEDIC SURGERY

## 2024-06-14 PROCEDURE — 99213 OFFICE O/P EST LOW 20 MIN: CPT | Performed by: ORTHOPAEDIC SURGERY

## 2024-06-14 RX ORDER — IBUPROFEN 800 MG/1
800 TABLET ORAL EVERY 8 HOURS PRN
Qty: 90 TABLET | Refills: 2 | Status: SHIPPED | OUTPATIENT
Start: 2024-06-14

## 2024-06-14 ASSESSMENT — ENCOUNTER SYMPTOMS
SHORTNESS OF BREATH: 0
CHEST TIGHTNESS: 0
COLOR CHANGE: 0

## 2024-06-14 NOTE — PROGRESS NOTES
Patient seen in office today for FU LT knee injection 5/7/24. Stated her knee is feeling a lot better. 2/10 pain level. Reported she does take the ibuprofen once a day to help sleeping.

## 2024-06-14 NOTE — PATIENT INSTRUCTIONS
Continue weight-bearing as tolerated.  Continue range of motion exercises as instructed.  Ice and elevate as needed.  Tylenol or Motrin for pain.  Follow up as needed.    We are committed to providing you the best care possible.     If you receive a survey after visiting one of our offices, please take time to share your experience concerning your physician office visit.  These surveys are confidential and no health information about you is shared.     We are eager to improve for you and we are counting on your feedback to help make that happen.

## 2024-06-14 NOTE — PROGRESS NOTES
6/14/2024   Chief Complaint   Patient presents with    Follow-up     LT knee injection 5/7/24        History of Present Illness:                             Marisa Feliz is a 64 y.o. female who returns today for follow-up of her left knee.  She has noticed good improvement with the steroid injection.  She continues to use her ibuprofen which seems to be helping with pain especially at night.    Patient seen in office today for FU LT knee injection 5/7/24. Stated her knee is feeling a lot better. 2/10 pain level. Reported she does take the ibuprofen once a day to help sleeping.        Medical History  Patient's medications, allergies, past medical, surgical, social and family histories were reviewed and updated as appropriate.      Review of Systems   Constitutional:  Negative for activity change and fever.   Respiratory:  Negative for chest tightness and shortness of breath.    Cardiovascular:  Negative for chest pain.   Skin:  Negative for color change.   Neurological:  Negative for dizziness and weakness.   Psychiatric/Behavioral:  The patient is not nervous/anxious.                                                Examination:  General Exam:  Vitals: BP (!) 178/129 (Site: Left Lower Arm, Position: Sitting, Cuff Size: Medium Adult) Comment: taken x3  Pulse 65   Ht 1.702 m (5' 7\")   SpO2 97%   BMI 44.93 kg/m²    Physical Exam     Left Lower Extremity:     There is improved but persistent mild tenderness to palpation diffusely throughout the knee, most significant along the anterior joint line.  There is a moderate knee joint effusion present and mild global swelling anteriorly.  Moderately restricted range of motion at the knee with approximately 5 degrees lack of full extension and knee flexion up to 100 degrees with pain at the extremes of motion.  Range of motion is severely limited due to pain and guarding     There is mild crepitation during active range of motion.  There is normal knee alignment.

## 2024-10-05 DIAGNOSIS — M17.12 PRIMARY OSTEOARTHRITIS OF LEFT KNEE: ICD-10-CM

## 2024-10-07 RX ORDER — IBUPROFEN 800 MG/1
800 TABLET, FILM COATED ORAL EVERY 8 HOURS PRN
Qty: 90 TABLET | Refills: 2 | OUTPATIENT
Start: 2024-10-07

## (undated) DEVICE — Z DISCONTINUED (USE MFG CAT MVABO)  TUBING GAS SAMPLING STD 6.5 FT FEMALE CONN SMRT CAPNOLINE

## (undated) DEVICE — FORCEPS BX L240CM JAW DIA2.8MM L CAP W/ NDL MIC MESH TOOTH